# Patient Record
Sex: FEMALE | Race: WHITE | NOT HISPANIC OR LATINO | Employment: OTHER | URBAN - METROPOLITAN AREA
[De-identification: names, ages, dates, MRNs, and addresses within clinical notes are randomized per-mention and may not be internally consistent; named-entity substitution may affect disease eponyms.]

---

## 2019-01-14 ENCOUNTER — OFFICE VISIT (OUTPATIENT)
Dept: OBGYN CLINIC | Facility: CLINIC | Age: 69
End: 2019-01-14
Payer: MEDICARE

## 2019-01-14 VITALS
SYSTOLIC BLOOD PRESSURE: 128 MMHG | DIASTOLIC BLOOD PRESSURE: 92 MMHG | WEIGHT: 139.5 LBS | BODY MASS INDEX: 26.34 KG/M2 | HEIGHT: 61 IN

## 2019-01-14 DIAGNOSIS — Z01.419 ENCOUNTER FOR GYNECOLOGICAL EXAMINATION (GENERAL) (ROUTINE) WITHOUT ABNORMAL FINDINGS: Primary | ICD-10-CM

## 2019-01-14 DIAGNOSIS — Z12.31 ENCOUNTER FOR SCREENING MAMMOGRAM FOR MALIGNANT NEOPLASM OF BREAST: ICD-10-CM

## 2019-01-14 PROBLEM — C50.912 MALIGNANT NEOPLASM OF LEFT FEMALE BREAST (HCC): Status: ACTIVE | Noted: 2019-01-14

## 2019-01-14 PROCEDURE — G0101 CA SCREEN;PELVIC/BREAST EXAM: HCPCS | Performed by: OBSTETRICS & GYNECOLOGY

## 2019-01-14 RX ORDER — LANOLIN ALCOHOL/MO/W.PET/CERES
CREAM (GRAM) TOPICAL
COMMUNITY

## 2019-01-14 RX ORDER — LANSOPRAZOLE 15 MG/1
15 CAPSULE, DELAYED RELEASE ORAL DAILY
COMMUNITY

## 2019-01-14 RX ORDER — ATORVASTATIN CALCIUM 10 MG/1
10 TABLET, FILM COATED ORAL
COMMUNITY

## 2019-01-14 RX ORDER — MINOCYCLINE HYDROCHLORIDE 50 MG/1
100 CAPSULE ORAL DAILY
COMMUNITY

## 2019-01-14 NOTE — PROGRESS NOTES
ASSESSMENT & PLAN: Husam Watt was seen today for gynecologic exam     Diagnoses and all orders for this visit:    Encounter for gynecological examination (general) (routine) without abnormal findings    Encounter for screening mammogram for malignant neoplasm of breast  -     Mammo screening bilateral w 3d & cad; Future      1  Routine well woman exam done today  2   Pap and HPV:  Pap and cotesting was not done today  Current ASCCP Guidelines reviewed  3   Mammogram was ordered  4   Colonoscopy is up to date  5   DEXA is up to date  DEXA was not ordered today  6  The following were reviewed in today's visit: adequate intake of calcium and vitamin D, exercise and healthy diet  7   F/u 1 year   8  Discussed trying different vaginal lubricants for intercourse and to consider other alternative therapies  CC:  Annual Gynecologic Examination    HPI: Elieser Fuchs is a 79 y o  who presents for annual gynecologic examination  She has the following concerns:  Pt with h/o hyster and intercourse is getting more painful  Pt with h/o breast cancer which was estrogen receptor  Using replens  Wants to know if there is any other options for people with h/o breast cancer  Health Maintenance:    Patient describes her health as excellent  Patient has weight concerns  She exercises 2 days per week with gym, skis and walks  She does wears her seatbelt routinely  She does perform regular monthly self breast exams  She does feel safe at home  Patients does not follow a special diet  Patient gets 1 servings of dairy or calcium rich foods daily        Last pap:   Last mammogram:   Last colonoscopy:   DEXA: 2017    Patient Active Problem List   Diagnosis    Malignant neoplasm of left female breast [facility])       Past Medical History:   Diagnosis Date    Cancer ([address] )     Hypertension        Past Surgical History:   Procedure Laterality Date    BREAST LUMPECTOMY Left      SECTION      COLONOSCOPY      FOOT ARTHRODESIS, MODIFIED DU      HYSTERECTOMY      MOHS SURGERY      SKIN CANCER EXCISION      BACK       Past OB/Gyn History:    Patient is currently sexually active  heterosexual     Family History   Problem Relation Age of Onset    Congenital heart disease Mother     Anuerysm Father     Heart attack Brother     Colon cancer Paternal Grandmother     Colon cancer Paternal Grandfather     Heart attack Paternal Aunt     Heart attack Paternal Uncle        Social History:   Social History     Social History    Marital status: /Civil Union     Spouse name: N/A    Number of children: N/A    Years of education: N/A     Occupational History    Not on file  Social History Main Topics    Smoking status: Never Smoker    Smokeless tobacco: Never Used    Alcohol use Yes      Comment: Social    Drug use: No    Sexual activity: Yes     Partners: Male     Birth control/ protection: Post-menopausal     Other Topics Concern    Not on file     Social History Narrative    No narrative on file     Presently lives with spouse  Patient is currently retired  Allergies   Allergen Reactions    Other          Current Outpatient Prescriptions:     atorvastatin (LIPITOR) 10 mg tablet, Take by mouth, Disp: , Rfl:     calcium citrate-vitamin D (CITRACAL+D) 315-200 MG-UNIT per tablet, Take by mouth, Disp: , Rfl:     lansoprazole (PREVACID) 15 mg capsule, Take 15 mg by mouth daily, Disp: , Rfl:     metoprolol tartrate (LOPRESSOR) 25 mg tablet, Take by mouth, Disp: , Rfl:     minocycline (MINOCIN) 100 mg capsule, Take 100 mg by mouth every 12 (twelve) hours, Disp: , Rfl:     Multiple Vitamin (MULTIVITAMINS PO), Take by mouth, Disp: , Rfl:     Review of Systems  Constitutional :no fever, feels well, no tiredness, no recent weight gain or loss  ENT: no ear ache, no loss of hearing, no nosebleeds or nasal discharge, no sore throat or hoarseness    Cardiovascular: no complaints of slow or fast heart beat, no chest pain, no palpitations, no leg claudication or lower extremity edema  Respiratory: no complaints of shortness of shortness of breath, no SMILEY  Breasts:no complaints of breast pain, breast lump, or nipple discharge  Gastrointestinal: no complaints of abdominal pain, constipation, nausea, vomiting, or diarrhea or bloody stools  Genitourinary : no complaints of dysuria, incontinence, pelvic pain, no dysmenorrhea, vaginal discharge or abnormal vaginal bleeding and as noted in HPI  Musculoskeletal: no complaints of arthralgia, no myalgia, no joint swelling or stiffness, no limb pain or swelling  Integumentary: no complaints of skin rash or lesion, itching or dry skin  Neurological: no complaints of headache, no confusion, no numbness or tingling, no dizziness or fainting     Physical Exam:   /92   Ht 5' 1" (1 549 m)   Wt 63 3 kg (139 lb 8 oz)   BMI 26 36 kg/m²     General: appears stated age, cooperative, alert normal mood and affect   Psychiatric oriented to person, place and time  Mood and affect normal   Neck: normal, supple,trachea midline, no masses  Thyroid: normal, no thyromegaly   Heart: regular rate and rhythm, S1, S2 normal, no murmur, click, rub or gallop   Lungs: clear to auscultation bilaterally, no increased work of breathing or signs of respiratory distress   Breasts: normal, no dimpling or skin changes noted, left breast with well-healed surgical incisions and evidence of lumpectomy     Abdomen: soft, non-tender, without masses or organomegaly   Vulva: normal , no lesions   Vagina: normal , no lesions or dryness   Urethra: normal   Urethal meatus normal   Bladder Normal, soft, non-tender and no prolapse or masses appreciated   Cervix: Surgically absent   Uterus: Surgically absent   Adnexa: normal, non-tender without fullness or masses   Lymphatic Palpation of lymph nodes in neck, axilla, groin and/or other locations: no lymphadenopathy or masses noted   Skin Normal skin turgor and no rashes    Palpation of skin and subcutaneous tissue normal

## 2019-02-19 ENCOUNTER — HOSPITAL ENCOUNTER (OUTPATIENT)
Dept: MAMMOGRAPHY | Facility: HOSPITAL | Age: 69
Discharge: HOME | End: 2019-02-19
Payer: MEDICARE

## 2019-02-19 VITALS — BODY MASS INDEX: 26.24 KG/M2 | HEIGHT: 61 IN | WEIGHT: 139 LBS

## 2019-02-19 DIAGNOSIS — Z12.31 ENCOUNTER FOR SCREENING MAMMOGRAM FOR MALIGNANT NEOPLASM OF BREAST: ICD-10-CM

## 2019-02-19 PROCEDURE — 77063 BREAST TOMOSYNTHESIS BI: CPT

## 2019-02-19 PROCEDURE — 77067 SCR MAMMO BI INCL CAD: CPT

## 2019-11-18 DIAGNOSIS — Z12.31 ENCOUNTER FOR SCREENING MAMMOGRAM FOR MALIGNANT NEOPLASM OF BREAST: Primary | ICD-10-CM

## 2020-01-17 ENCOUNTER — ANNUAL EXAM (OUTPATIENT)
Dept: OBGYN CLINIC | Facility: CLINIC | Age: 70
End: 2020-01-17
Payer: MEDICARE

## 2020-01-17 VITALS
HEIGHT: 61 IN | SYSTOLIC BLOOD PRESSURE: 130 MMHG | BODY MASS INDEX: 22.94 KG/M2 | DIASTOLIC BLOOD PRESSURE: 90 MMHG | WEIGHT: 121.5 LBS

## 2020-01-17 DIAGNOSIS — Z01.419 ENCOUNTER FOR GYNECOLOGICAL EXAMINATION (GENERAL) (ROUTINE) WITHOUT ABNORMAL FINDINGS: Primary | ICD-10-CM

## 2020-01-17 DIAGNOSIS — Z13.820 ENCOUNTER FOR OSTEOPOROSIS SCREENING IN ASYMPTOMATIC POSTMENOPAUSAL PATIENT: ICD-10-CM

## 2020-01-17 DIAGNOSIS — Z78.0 ENCOUNTER FOR OSTEOPOROSIS SCREENING IN ASYMPTOMATIC POSTMENOPAUSAL PATIENT: ICD-10-CM

## 2020-01-17 PROCEDURE — G0101 CA SCREEN;PELVIC/BREAST EXAM: HCPCS | Performed by: OBSTETRICS & GYNECOLOGY

## 2020-01-17 NOTE — PATIENT INSTRUCTIONS
Thank you for your confidence in our team    We appreciate you and welcome your feedback  If you receive a survey from us, please take a few moments to let us know how we are doing     Sincerely,   Tana Morris MD

## 2020-01-17 NOTE — PROGRESS NOTES
ASSESSMENT & PLAN: Edwin Manjarrez was seen today for gynecologic exam     Diagnoses and all orders for this visit:    Encounter for gynecological examination (general) (routine) without abnormal findings    Encounter for osteoporosis screening in asymptomatic postmenopausal patient  -     DXA bone density spine hip and pelvis; Future      1  Routine well woman exam done today  2   Pap and HPV:  Pap and cotesting was not done today  Current ASCCP Guidelines reviewed  3   Mammogram was ordered  4   Colorectal cancer screening is not up to date  Patient given contact information for GI to schedule  5   DEXA is not up to date  DEXA was ordered today  6  The following were reviewed in today's visit: adequate intake of calcium and vitamin D, exercise and healthy diet  7   F/u 1 year for next routine gyn exam       CC:  Annual Gynecologic Examination    HPI: Consuelo Barnett is a 81 y o  who presents for annual gynecologic examination  She has the following concerns:  No issues  Health Maintenance:    Patient describes her health as excellent  Patient does not have weight concerns  She exercises 2-3 days per week with gym, skis and walks  She does wear her seatbelt routinely  She does perform regular monthly self breast exams  She does feel safe at home  Patients does follow a healthy diet  Patient gets 1 servings of dairy or calcium rich foods daily        Last pap: n/a  Last mammogram:   Last colorectal cancer screenin  Last DEXA:     Patient Active Problem List   Diagnosis    Malignant neoplasm of left female breast [facility])       Past Medical History:   Diagnosis Date    [facility] [address] [address] 10/2004    lt breast lumpectomy    Cancer [address] [address]     History of radiation therapy 10/2004    lt breast cancer lumpectomy    Hypertension        Past Surgical History:   Procedure Laterality Date    BREAST BIOPSY Left     benign    BREAST LUMPECTOMY Left 10/2004    lt breast cancer     SECTION      COLONOSCOPY      FOOT ARTHRODESIS, MODIFIED DU      HYSTERECTOMY Bilateral     2006    MOHS SURGERY      OOPHORECTOMY Bilateral 2006    SKIN CANCER EXCISION      BACK       Past OB/Gyn History:    Patient is currently sexually active    heterosexual     Family History   Problem Relation Age of Onset    Congenital heart disease Mother     Breast cancer Mother     Anuerysm Father     Heart attack Brother     Colon cancer Paternal Grandmother     Colon cancer Paternal Grandfather     Heart attack Paternal Aunt     Heart attack Paternal Uncle        Social History:   Social History     Socioeconomic History    Marital status: /Civil Union     Spouse name: Not on file    Number of children: Not on file    Years of education: Not on file    Highest education level: Not on file   Occupational History    Not on file   Social Needs    Financial resource strain: Not on file    Food insecurity:     Worry: Not on file     Inability: Not on file    Transportation needs:     Medical: Not on file     Non-medical: Not on file   Tobacco Use    Smoking status: Never Smoker    Smokeless tobacco: Never Used   Substance and Sexual Activity    Alcohol use: Yes     Comment: Social    Drug use: No    Sexual activity: Yes     Partners: Male     Birth control/protection: Post-menopausal   Lifestyle    Physical activity:     Days per week: Not on file     Minutes per session: Not on file    Stress: Not on file   Relationships    Social connections:     Talks on phone: Not on file     Gets together: Not on file     Attends [employer]ars[employer] service: Not on file     Active member of club or organization: Not on file     Attends meetings of clubs or organizations: Not on file     Relationship status: Not on file    Intimate partner violence:     Fear of current or ex partner: Not on file     Emotionally abused: Not on file     Physically abused: Not on file     Forced sexual activity: Not on file Other Topics Concern    Not on file   Social History Narrative    Not on file     Presently lives with spouse  Patient is currently retired  Allergies   Allergen Reactions    Adhesive [Medical Tape]          Current Outpatient Medications:     atorvastatin (LIPITOR) 10 mg tablet, Take by mouth, Disp: , Rfl:     calcium citrate-vitamin D (CITRACAL+D) 315-200 MG-UNIT per tablet, Take by mouth, Disp: , Rfl:     lansoprazole (PREVACID) 15 mg capsule, Take 15 mg by mouth daily, Disp: , Rfl:     metoprolol tartrate (LOPRESSOR) 25 mg tablet, Take by mouth, Disp: , Rfl:     minocycline (MINOCIN) 100 mg capsule, Take 100 mg by mouth daily , Disp: , Rfl:     Multiple Vitamin (MULTIVITAMINS PO), Take by mouth, Disp: , Rfl:     Review of Systems  Constitutional :no fever, feels well, no tiredness, no recent weight gain or loss  ENT: no ear ache, no loss of hearing, no nosebleeds or nasal discharge, no sore throat or hoarseness  Cardiovascular: no complaints of slow or fast heart beat, no chest pain, no palpitations, no leg claudication or lower extremity edema  Respiratory: no complaints of shortness of shortness of breath, no SMILEY  Breasts:no complaints of breast pain, breast lump, or nipple discharge  Gastrointestinal: no complaints of abdominal pain, constipation, nausea, vomiting, or diarrhea or bloody stools  Genitourinary : no complaints of dysuria, incontinence, pelvic pain, no dysmenorrhea, vaginal discharge or abnormal vaginal bleeding and as noted in HPI  Musculoskeletal: no complaints of arthralgia, no myalgia, no joint swelling or stiffness, no limb pain or swelling    Integumentary: no complaints of skin rash or lesion, itching or dry skin  Neurological: no complaints of headache, no confusion, no numbness or tingling, no dizziness or fainting     Physical Exam:   /90   Ht 5' 1" (1 549 m)   Wt 55 1 kg (121 lb 8 oz)   BMI 22 96 kg/m²     General: appears stated age, cooperative, alert normal mood and affect   Psychiatric oriented to person, place and time  Mood and affect normal   Neck: normal, supple,trachea midline, no masses  Thyroid: normal, no thyromegaly   Heart: regular rate and rhythm, S1, S2 normal, no murmur, click, rub or gallop   Lungs: clear to auscultation bilaterally, no increased work of breathing or signs of respiratory distress   Breasts: normal, no dimpling or skin changes noted, left breast with well-healed surgical incisions and evidence of lumpectomy  Abdomen: soft, non-tender, without masses or organomegaly   Vulva: normal , no lesions   Vagina: normal , no lesions, severe dryness   Urethra: normal   Urethal meatus normal   Bladder Normal, soft, non-tender and no prolapse or masses appreciated   Cervix: Surgically absent   Uterus: Surgically absent   Adnexa: normal, non-tender without fullness or masses   Lymphatic Palpation of lymph nodes in neck, axilla, groin and/or other locations: no lymphadenopathy or masses noted   Skin Normal skin turgor and no rashes    Palpation of skin and subcutaneous tissue normal

## 2020-02-20 ENCOUNTER — HOSPITAL ENCOUNTER (OUTPATIENT)
Dept: MAMMOGRAPHY | Facility: HOSPITAL | Age: 70
Discharge: HOME | End: 2020-02-20
Payer: MEDICARE

## 2020-02-20 ENCOUNTER — HOSPITAL ENCOUNTER (OUTPATIENT)
Dept: BONE DENSITY | Facility: HOSPITAL | Age: 70
Discharge: HOME | End: 2020-02-20
Payer: MEDICARE

## 2020-02-20 VITALS — HEIGHT: 61 IN | WEIGHT: 122 LBS | BODY MASS INDEX: 23.03 KG/M2

## 2020-02-20 DIAGNOSIS — Z12.31 ENCOUNTER FOR SCREENING MAMMOGRAM FOR MALIGNANT NEOPLASM OF BREAST: ICD-10-CM

## 2020-02-20 DIAGNOSIS — Z78.0 ENCOUNTER FOR OSTEOPOROSIS SCREENING IN ASYMPTOMATIC POSTMENOPAUSAL PATIENT: ICD-10-CM

## 2020-02-20 DIAGNOSIS — Z13.820 ENCOUNTER FOR OSTEOPOROSIS SCREENING IN ASYMPTOMATIC POSTMENOPAUSAL PATIENT: ICD-10-CM

## 2020-02-20 PROCEDURE — 77063 BREAST TOMOSYNTHESIS BI: CPT

## 2020-02-20 PROCEDURE — 77067 SCR MAMMO BI INCL CAD: CPT

## 2020-02-20 PROCEDURE — 77080 DXA BONE DENSITY AXIAL: CPT

## 2020-02-24 NOTE — RESULT ENCOUNTER NOTE
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

## 2020-07-29 ENCOUNTER — HOSPITAL ENCOUNTER (OUTPATIENT)
Dept: RADIOLOGY | Facility: HOSPITAL | Age: 70
Discharge: HOME | End: 2020-07-29
Attending: INTERNAL MEDICINE
Payer: MEDICARE

## 2020-07-29 ENCOUNTER — TRANSCRIBE ORDERS (OUTPATIENT)
Dept: ADMINISTRATIVE | Facility: HOSPITAL | Age: 70
End: 2020-07-29

## 2020-07-29 DIAGNOSIS — M53.3 COCCYX PAIN: ICD-10-CM

## 2020-07-29 DIAGNOSIS — M54.2 NECK PAIN: ICD-10-CM

## 2020-07-29 DIAGNOSIS — M54.2 NECK PAIN: Primary | ICD-10-CM

## 2020-07-29 PROCEDURE — 72050 X-RAY EXAM NECK SPINE 4/5VWS: CPT

## 2020-07-29 PROCEDURE — 72110 X-RAY EXAM L-2 SPINE 4/>VWS: CPT

## 2021-02-17 ENCOUNTER — NURSE TRIAGE (OUTPATIENT)
Dept: OTHER | Facility: OTHER | Age: 71
End: 2021-02-17

## 2021-02-17 DIAGNOSIS — B34.9 VIRAL SYNDROME: Primary | ICD-10-CM

## 2021-02-17 DIAGNOSIS — B34.9 VIRAL SYNDROME: ICD-10-CM

## 2021-02-17 PROCEDURE — U0003 INFECTIOUS AGENT DETECTION BY NUCLEIC ACID (DNA OR RNA); SEVERE ACUTE RESPIRATORY SYNDROME CORONAVIRUS 2 (SARS-COV-2) (CORONAVIRUS DISEASE [COVID-19]), AMPLIFIED PROBE TECHNIQUE, MAKING USE OF HIGH THROUGHPUT TECHNOLOGIES AS DESCRIBED BY CMS-2020-01-R: HCPCS | Performed by: FAMILY MEDICINE

## 2021-02-17 PROCEDURE — U0005 INFEC AGEN DETEC AMPLI PROBE: HCPCS | Performed by: FAMILY MEDICINE

## 2021-02-17 NOTE — TELEPHONE ENCOUNTER
1  Were you within 6 feet or less, for up to 15 minutes or more with a person that has a confirmed COVID-19 test?     (+) close contact with daughter who tested positive on Monday 2/15/21    2  What was the date of your exposure? Friday 2/12/21 and over the weekend    3  Are you experiencing any symptoms attributed to the virus?  (Assess for SOB, cough, fever, difficulty breathing)     Cough, chest congestion/heaviness, fatigue    4  HIGH RISK: Do you have any history heart or lung conditions, weakened immune system, diabetes, Asthma, CHF, HIV, COPD, Chemo, renal failure, sickle cell, etc?     Hypertension, hyperlipidemia    5   PREGNANCY: Are you pregnant or did you recently give birth? n/a

## 2021-02-17 NOTE — TELEPHONE ENCOUNTER
Cardiology Reason for Disposition   [1] COVID-19 infection suspected by caller or triager AND [2] mild symptoms (cough, fever, or others) AND [0] no complications or SOB    Protocols used: CORONAVIRUS (COVID-19) DIAGNOSED OR SUSPECTED-ADULT-[place]

## 2021-02-17 NOTE — TELEPHONE ENCOUNTER
Discussed with patient's  criteria to be seen in person at an urgent care center or emergency department, including but not limited to chest pain, shortness of breath, and/or high fever (103F or higher) not responding to antipyretics  Also advised to contact PCP regarding symptoms and to establish possible virtual appointment, particularly if test result is positive

## 2021-02-17 NOTE — TELEPHONE ENCOUNTER
Regarding: covid symps cough tired   ----- Message from [employer] sent at 2/17/2021 12:50 PM EST -----  Covid Exposure and symp "coughing heavy chested tired and feeling week"    2 of 2

## 2021-02-18 ENCOUNTER — TELEPHONE (OUTPATIENT)
Dept: OTHER | Facility: OTHER | Age: 71
End: 2021-02-18

## 2021-02-18 LAB — SARS-COV-2 RNA RESP QL NAA+PROBE: POSITIVE

## 2021-02-19 NOTE — TELEPHONE ENCOUNTER
Your test for the novel coronavirus, also known as COVID-19, was positive  The sample showed that the virus was present  Positive COVID-19 test results are reportable to the [place] Department of Health  You may receive a call from trained public health staff to conduct an interview  It is important to answer their call  They will ask you to verify who you are  During the call they will ask you about what symptoms you have, what you did before you got sick, and who you were close to while sick  The health department does this to make sure everyone stays healthy and to reduce the spread of the virus  If you would like to verify if the caller does in fact work in contact tracing, call the [address] [place] [address] at [employer] ([phone])  For additional information, please visit the [facility]  website: [email or web address]     If you have any additional questions, we can schedule a virtual visit for you with a provider or call the [facility] hotline [phone], option 7, for care advice    For additional information, please visit the Coronavirus FAQ on the [facility] home page ([email or web address])

## 2021-03-10 DIAGNOSIS — Z23 ENCOUNTER FOR IMMUNIZATION: ICD-10-CM

## 2021-06-03 ENCOUNTER — OFFICE VISIT (OUTPATIENT)
Dept: CARDIOLOGY CLINIC | Facility: CLINIC | Age: 71
End: 2021-06-03
Payer: MEDICARE

## 2021-06-03 VITALS
SYSTOLIC BLOOD PRESSURE: 130 MMHG | WEIGHT: 122 LBS | DIASTOLIC BLOOD PRESSURE: 80 MMHG | HEART RATE: 70 BPM | HEIGHT: 62 IN | BODY MASS INDEX: 22.45 KG/M2

## 2021-06-03 DIAGNOSIS — I10 BENIGN HYPERTENSION: Primary | ICD-10-CM

## 2021-06-03 DIAGNOSIS — I49.3 PVC'S (PREMATURE VENTRICULAR CONTRACTIONS): ICD-10-CM

## 2021-06-03 PROCEDURE — 99203 OFFICE O/P NEW LOW 30 MIN: CPT | Performed by: INTERNAL MEDICINE

## 2021-06-03 PROCEDURE — 93000 ELECTROCARDIOGRAM COMPLETE: CPT | Performed by: INTERNAL MEDICINE

## 2021-06-03 NOTE — ASSESSMENT & PLAN NOTE
Frequent on exam although not found on the actual EKG we did today  I would like to be sure there is no underlying cardiomyopathy either as a cause of ectopy or from ectopy    A Holter monitor will be checked as will an echocardiogram

## 2021-06-03 NOTE — PROGRESS NOTES
Patient ID: Mohan Stoddard is a 78 y o  female  Plan:      Benign hypertension  Adequately controlled  PVC's (premature ventricular contractions)  Frequent on exam although not found on the actual EKG we did today  I would like to be sure there is no underlying cardiomyopathy either as a cause of ectopy or from ectopy  A Holter monitor will be checked as will an echocardiogram        Follow up Plan/Other summary comments: Follow-up only if the echo is abnormal and or the Holter appears problematic  HPI:   Patient is seen today to establish local cardiology care  Her daughter is and a physician in [facility] [place] had COVID in February  Most recently she has essentially felt well  She was told of extra beats but she does not feel them  Her exertional capacity has returned almost to pre COVID levels  There is certainly no chest pain or chest pressure  No syncope or near syncope  There is a prior history of left breast cancer with lumpectomy and radiation but no chemotherapy  Results for orders placed or performed in visit on 21   POCT ECG    Impression    NSR  WNL  Most recent or relevant cardiac/vascular testing:    None      Past Surgical History:   Procedure Laterality Date    BREAST BIOPSY Left     benign    BREAST LUMPECTOMY Left 10/2004    lt breast cancer     SECTION      COLONOSCOPY      FOOT ARTHRODESIS, MODIFIED DU      HYSTERECTOMY Bilateral         MOHS SURGERY      OOPHORECTOMY Bilateral     SKIN CANCER EXCISION      BACK     CMP: No results found for: NA, K, CL, CO2, BUN, CREATININE, GLUCOSE, EGFR    Lipid Profile: No results found for: CHOL, TRIG, HDL, LDL      Review of Systems   10  point ROS  was otherwise non pertinent or negative except as per HPI or as below     Gait: Normal         Objective:     /80   Pulse 70   Ht 5' 1 5" (1 562 m)   Wt 55 3 kg (122 lb)   BMI 22 68 kg/m²     PHYSICAL EXAM:    General:  Normal appearance in no distress  Eyes:  Anicteric  Oral mucosa:  Moist   Neck:  No JVD  Carotid upstrokes are brisk without bruits  No masses  Chest:  Clear to auscultation  Cardiac:  No palpable PMI  Normal S1 and S2  No murmur gallop or rub  Abdomen:  Soft and nontender  No palpable organomegaly or aortic enlargement  Extremities:  No peripheral edema  Musculoskeletal:  Symmetric  Vascular:  Femoral pulses are brisk without bruits  Popliteal pulses are intact bilaterally  Pedal pulses are intact  Neuro:  Grossly symmetric  Psych:  Alert and oriented x3          Current Outpatient Medications:     atorvastatin (LIPITOR) 10 mg tablet, Take 10 mg by mouth one tablet three times a week , Disp: , Rfl:     calcium citrate-vitamin D (CITRACAL+D) 315-200 MG-UNIT per tablet, Take by mouth, Disp: , Rfl:     metoprolol tartrate (LOPRESSOR) 25 mg tablet, Take 25 mg by mouth every 12 (twelve) hours , Disp: , Rfl:     minocycline (Minocin) 50 mg capsule, Take 100 mg by mouth daily , Disp: , Rfl:     Multiple Vitamin (MULTIVITAMINS PO), Take by mouth, Disp: , Rfl:     lansoprazole (PREVACID) 15 mg capsule, Take 15 mg by mouth daily, Disp: , Rfl:   Allergies   Allergen Reactions    Adhesive [Medical Tape]      Past Medical History:   Diagnosis Date    BRCA1 negative     BRCA2 negative     [facility] ) 10/2004    lt breast lumpectomy    Cancer ([facility] )     History of radiation therapy 10/2004    lt breast cancer lumpectomy    Hyperlipidemia     Hypertension            Social History     Tobacco Use   Smoking Status Never Smoker   Smokeless Tobacco Never Used

## 2021-06-07 ENCOUNTER — HOSPITAL ENCOUNTER (OUTPATIENT)
Dept: NON INVASIVE DIAGNOSTICS | Facility: CLINIC | Age: 71
Discharge: HOME | End: 2021-06-07
Payer: MEDICARE

## 2021-06-07 DIAGNOSIS — I49.3 PVC'S (PREMATURE VENTRICULAR CONTRACTIONS): ICD-10-CM

## 2021-06-07 DIAGNOSIS — I10 BENIGN HYPERTENSION: ICD-10-CM

## 2021-06-07 PROCEDURE — 93225 XTRNL ECG REC<48 HRS REC: CPT

## 2021-06-07 PROCEDURE — 93226 XTRNL ECG REC<48 HR SCAN A/R: CPT

## 2021-06-15 PROCEDURE — 93227 XTRNL ECG REC<48 HR R&I: CPT | Performed by: INTERNAL MEDICINE

## 2021-07-19 ENCOUNTER — HOSPITAL ENCOUNTER (OUTPATIENT)
Dept: NON INVASIVE DIAGNOSTICS | Facility: CLINIC | Age: 71
Discharge: HOME | End: 2021-07-19
Payer: MEDICARE

## 2021-07-19 DIAGNOSIS — I10 BENIGN HYPERTENSION: ICD-10-CM

## 2021-07-19 DIAGNOSIS — I49.3 PVC'S (PREMATURE VENTRICULAR CONTRACTIONS): ICD-10-CM

## 2021-07-19 PROCEDURE — 93306 TTE W/DOPPLER COMPLETE: CPT

## 2021-07-19 PROCEDURE — 93306 TTE W/DOPPLER COMPLETE: CPT | Performed by: INTERNAL MEDICINE

## 2021-11-02 ENCOUNTER — APPOINTMENT (OUTPATIENT)
Dept: LAB | Facility: CLINIC | Age: 71
End: 2021-11-02
Payer: MEDICARE

## 2021-11-02 DIAGNOSIS — E55.9 AVITAMINOSIS D: ICD-10-CM

## 2021-11-02 DIAGNOSIS — Z79.899 ENCOUNTER FOR LONG-TERM (CURRENT) USE OF OTHER MEDICATIONS: ICD-10-CM

## 2021-11-02 DIAGNOSIS — D64.9 ANEMIA, UNSPECIFIED TYPE: ICD-10-CM

## 2021-11-02 DIAGNOSIS — E78.5 HYPERLIPIDEMIA, UNSPECIFIED HYPERLIPIDEMIA TYPE: ICD-10-CM

## 2021-11-02 DIAGNOSIS — I10 ESSENTIAL HYPERTENSION, MALIGNANT: ICD-10-CM

## 2021-11-02 LAB
25(OH)D3 SERPL-MCNC: 39 NG/ML (ref 30–100)
ALBUMIN SERPL BCP-MCNC: 4.2 G/DL (ref 3.5–5)
ALP SERPL-CCNC: 89 U/L (ref 46–116)
ALT SERPL W P-5'-P-CCNC: 36 U/L (ref 12–78)
ANION GAP SERPL CALCULATED.3IONS-SCNC: 5 MMOL/L (ref 4–13)
AST SERPL W P-5'-P-CCNC: 29 U/L (ref 5–45)
BILIRUB SERPL-MCNC: 1 MG/DL (ref 0.2–1)
BUN SERPL-MCNC: 18 MG/DL (ref 5–25)
CALCIUM SERPL-MCNC: 9.4 MG/DL (ref 8.3–10.1)
CHLORIDE SERPL-SCNC: 107 MMOL/L (ref 100–108)
CHOLEST SERPL-MCNC: 211 MG/DL (ref 50–200)
CO2 SERPL-SCNC: 27 MMOL/L (ref 21–32)
CREAT SERPL-MCNC: 0.78 MG/DL (ref 0.6–1.3)
ERYTHROCYTE [DISTWIDTH] IN BLOOD BY AUTOMATED COUNT: 14 % (ref 11.6–15.1)
FERRITIN SERPL-MCNC: 77 NG/ML (ref 8–388)
GFR SERPL CREATININE-BSD FRML MDRD: 78 ML/MIN/1.73SQ M
GLUCOSE P FAST SERPL-MCNC: 90 MG/DL (ref 65–99)
HCT VFR BLD AUTO: 44.1 % (ref 34.8–46.1)
HDLC SERPL-MCNC: 101 MG/DL
HGB BLD-MCNC: 14.5 G/DL (ref 11.5–15.4)
IRON SERPL-MCNC: 186 UG/DL (ref 50–170)
LDLC SERPL CALC-MCNC: 94 MG/DL (ref 0–100)
MCH RBC QN AUTO: 31.9 PG (ref 26.8–34.3)
MCHC RBC AUTO-ENTMCNC: 32.9 G/DL (ref 31.4–37.4)
MCV RBC AUTO: 97 FL (ref 82–98)
NONHDLC SERPL-MCNC: 110 MG/DL
PLATELET # BLD AUTO: 225 THOUSANDS/UL (ref 149–390)
PMV BLD AUTO: 10.1 FL (ref 8.9–12.7)
POTASSIUM SERPL-SCNC: 3.8 MMOL/L (ref 3.5–5.3)
PROT SERPL-MCNC: 7.1 G/DL (ref 6.4–8.2)
RBC # BLD AUTO: 4.55 MILLION/UL (ref 3.81–5.12)
SODIUM SERPL-SCNC: 139 MMOL/L (ref 136–145)
TIBC SERPL-MCNC: 277 UG/DL (ref 250–450)
TRIGL SERPL-MCNC: 79 MG/DL
WBC # BLD AUTO: 3.38 THOUSAND/UL (ref 4.31–10.16)

## 2021-11-02 PROCEDURE — 80061 LIPID PANEL: CPT

## 2021-11-02 PROCEDURE — 83550 IRON BINDING TEST: CPT

## 2021-11-02 PROCEDURE — 80053 COMPREHEN METABOLIC PANEL: CPT

## 2021-11-02 PROCEDURE — 83540 ASSAY OF IRON: CPT

## 2021-11-02 PROCEDURE — 82728 ASSAY OF FERRITIN: CPT

## 2021-11-02 PROCEDURE — 36415 COLL VENOUS BLD VENIPUNCTURE: CPT

## 2021-11-02 PROCEDURE — 85027 COMPLETE CBC AUTOMATED: CPT

## 2021-11-02 PROCEDURE — 82747 ASSAY OF FOLIC ACID RBC: CPT

## 2021-11-02 PROCEDURE — 82306 VITAMIN D 25 HYDROXY: CPT

## 2021-11-03 LAB
FOLATE BLD-MCNC: 480 NG/ML
FOLATE RBC-MCNC: 1101 NG/ML
HCT VFR BLD AUTO: 43.6 % (ref 34–46.6)

## 2022-04-29 ENCOUNTER — APPOINTMENT (OUTPATIENT)
Dept: LAB | Facility: CLINIC | Age: 72
End: 2022-04-29
Payer: MEDICARE

## 2022-04-29 DIAGNOSIS — E55.9 AVITAMINOSIS D: ICD-10-CM

## 2022-04-29 DIAGNOSIS — E03.9 ACQUIRED HYPOTHYROIDISM: ICD-10-CM

## 2022-04-29 DIAGNOSIS — Z79.899 ENCOUNTER FOR LONG-TERM (CURRENT) USE OF OTHER MEDICATIONS: ICD-10-CM

## 2022-04-29 DIAGNOSIS — I10 ESSENTIAL HYPERTENSION, MALIGNANT: ICD-10-CM

## 2022-04-29 DIAGNOSIS — E78.5 HYPERLIPIDEMIA, UNSPECIFIED HYPERLIPIDEMIA TYPE: ICD-10-CM

## 2022-04-29 DIAGNOSIS — D64.9 ANEMIA, UNSPECIFIED TYPE: ICD-10-CM

## 2022-04-29 DIAGNOSIS — R73.9 HYPERGLYCEMIA: ICD-10-CM

## 2022-04-29 LAB
25(OH)D3 SERPL-MCNC: 50.6 NG/ML (ref 30–100)
ALBUMIN SERPL BCP-MCNC: 3.8 G/DL (ref 3.5–5)
ALP SERPL-CCNC: 93 U/L (ref 46–116)
ALT SERPL W P-5'-P-CCNC: 40 U/L (ref 12–78)
ANION GAP SERPL CALCULATED.3IONS-SCNC: 1 MMOL/L (ref 4–13)
AST SERPL W P-5'-P-CCNC: 31 U/L (ref 5–45)
BILIRUB SERPL-MCNC: 0.92 MG/DL (ref 0.2–1)
BUN SERPL-MCNC: 17 MG/DL (ref 5–25)
CALCIUM SERPL-MCNC: 9.6 MG/DL (ref 8.3–10.1)
CHLORIDE SERPL-SCNC: 108 MMOL/L (ref 100–108)
CHOLEST SERPL-MCNC: 207 MG/DL
CO2 SERPL-SCNC: 31 MMOL/L (ref 21–32)
CREAT SERPL-MCNC: 0.91 MG/DL (ref 0.6–1.3)
ERYTHROCYTE [DISTWIDTH] IN BLOOD BY AUTOMATED COUNT: 14.4 % (ref 11.6–15.1)
EST. AVERAGE GLUCOSE BLD GHB EST-MCNC: 103 MG/DL
FERRITIN SERPL-MCNC: 67 NG/ML (ref 8–388)
GFR SERPL CREATININE-BSD FRML MDRD: 64 ML/MIN/1.73SQ M
GLUCOSE P FAST SERPL-MCNC: 92 MG/DL (ref 65–99)
HBA1C MFR BLD: 5.2 %
HCT VFR BLD AUTO: 44.1 % (ref 34.8–46.1)
HDLC SERPL-MCNC: 98 MG/DL
HGB BLD-MCNC: 14.6 G/DL (ref 11.5–15.4)
IRON SERPL-MCNC: 203 UG/DL (ref 50–170)
LDLC SERPL CALC-MCNC: 98 MG/DL (ref 0–100)
MCH RBC QN AUTO: 31.5 PG (ref 26.8–34.3)
MCHC RBC AUTO-ENTMCNC: 33.1 G/DL (ref 31.4–37.4)
MCV RBC AUTO: 95 FL (ref 82–98)
NONHDLC SERPL-MCNC: 109 MG/DL
PLATELET # BLD AUTO: 234 THOUSANDS/UL (ref 149–390)
PMV BLD AUTO: 10.3 FL (ref 8.9–12.7)
POTASSIUM SERPL-SCNC: 4.3 MMOL/L (ref 3.5–5.3)
PROT SERPL-MCNC: 7.1 G/DL (ref 6.4–8.2)
RBC # BLD AUTO: 4.64 MILLION/UL (ref 3.81–5.12)
SODIUM SERPL-SCNC: 140 MMOL/L (ref 136–145)
T4 FREE SERPL-MCNC: 1.01 NG/DL (ref 0.76–1.46)
TIBC SERPL-MCNC: 291 UG/DL (ref 250–450)
TRIGL SERPL-MCNC: 56 MG/DL
TSH SERPL DL<=0.05 MIU/L-ACNC: 3.47 UIU/ML (ref 0.45–4.5)
WBC # BLD AUTO: 3.65 THOUSAND/UL (ref 4.31–10.16)

## 2022-04-29 PROCEDURE — 82728 ASSAY OF FERRITIN: CPT

## 2022-04-29 PROCEDURE — 83540 ASSAY OF IRON: CPT

## 2022-04-29 PROCEDURE — 84439 ASSAY OF FREE THYROXINE: CPT

## 2022-04-29 PROCEDURE — 82747 ASSAY OF FOLIC ACID RBC: CPT

## 2022-04-29 PROCEDURE — 80061 LIPID PANEL: CPT

## 2022-04-29 PROCEDURE — 83550 IRON BINDING TEST: CPT

## 2022-04-29 PROCEDURE — 84443 ASSAY THYROID STIM HORMONE: CPT

## 2022-04-29 PROCEDURE — 83036 HEMOGLOBIN GLYCOSYLATED A1C: CPT

## 2022-04-29 PROCEDURE — 36415 COLL VENOUS BLD VENIPUNCTURE: CPT

## 2022-04-29 PROCEDURE — 85027 COMPLETE CBC AUTOMATED: CPT

## 2022-04-29 PROCEDURE — 82306 VITAMIN D 25 HYDROXY: CPT

## 2022-04-29 PROCEDURE — 80053 COMPREHEN METABOLIC PANEL: CPT

## 2022-04-30 LAB
FOLATE BLD-MCNC: >620 NG/ML
FOLATE RBC-MCNC: >1432 NG/ML
HCT VFR BLD AUTO: 43.3 % (ref 34–46.6)

## 2022-11-08 ENCOUNTER — APPOINTMENT (OUTPATIENT)
Dept: LAB | Facility: CLINIC | Age: 72
End: 2022-11-08

## 2022-11-08 DIAGNOSIS — Z79.02 ENCOUNTER FOR LONG-TERM (CURRENT) USE OF ANTIPLATELETS/ANTITHROMBOTICS: ICD-10-CM

## 2022-11-08 DIAGNOSIS — I51.9 MYXEDEMA HEART DISEASE: ICD-10-CM

## 2022-11-08 DIAGNOSIS — E03.9 MYXEDEMA HEART DISEASE: ICD-10-CM

## 2022-11-08 DIAGNOSIS — E55.9 VITAMIN D DEFICIENCY: ICD-10-CM

## 2022-11-08 DIAGNOSIS — I10 ESSENTIAL HYPERTENSION, MALIGNANT: ICD-10-CM

## 2022-11-08 DIAGNOSIS — E78.00 PURE HYPERCHOLESTEROLEMIA: ICD-10-CM

## 2022-11-08 LAB
25(OH)D3 SERPL-MCNC: 44.3 NG/ML (ref 30–100)
ALBUMIN SERPL BCP-MCNC: 3.8 G/DL (ref 3.5–5)
ALP SERPL-CCNC: 89 U/L (ref 46–116)
ALT SERPL W P-5'-P-CCNC: 35 U/L (ref 12–78)
ANION GAP SERPL CALCULATED.3IONS-SCNC: 2 MMOL/L (ref 4–13)
AST SERPL W P-5'-P-CCNC: 26 U/L (ref 5–45)
BILIRUB SERPL-MCNC: 0.8 MG/DL (ref 0.2–1)
BUN SERPL-MCNC: 13 MG/DL (ref 5–25)
CALCIUM SERPL-MCNC: 9.5 MG/DL (ref 8.3–10.1)
CHLORIDE SERPL-SCNC: 108 MMOL/L (ref 96–108)
CHOLEST SERPL-MCNC: 203 MG/DL
CO2 SERPL-SCNC: 30 MMOL/L (ref 21–32)
CREAT SERPL-MCNC: 0.83 MG/DL (ref 0.6–1.3)
ERYTHROCYTE [DISTWIDTH] IN BLOOD BY AUTOMATED COUNT: 14.2 % (ref 11.6–15.1)
EST. AVERAGE GLUCOSE BLD GHB EST-MCNC: 103 MG/DL
GFR SERPL CREATININE-BSD FRML MDRD: 71 ML/MIN/1.73SQ M
GLUCOSE P FAST SERPL-MCNC: 96 MG/DL (ref 65–99)
HBA1C MFR BLD: 5.2 %
HCT VFR BLD AUTO: 44.2 % (ref 34.8–46.1)
HDLC SERPL-MCNC: 79 MG/DL
HGB BLD-MCNC: 14.1 G/DL (ref 11.5–15.4)
LDLC SERPL CALC-MCNC: 89 MG/DL (ref 0–100)
MCH RBC QN AUTO: 30.6 PG (ref 26.8–34.3)
MCHC RBC AUTO-ENTMCNC: 31.9 G/DL (ref 31.4–37.4)
MCV RBC AUTO: 96 FL (ref 82–98)
NONHDLC SERPL-MCNC: 124 MG/DL
PLATELET # BLD AUTO: 276 THOUSANDS/UL (ref 149–390)
PMV BLD AUTO: 9.9 FL (ref 8.9–12.7)
POTASSIUM SERPL-SCNC: 4.2 MMOL/L (ref 3.5–5.3)
PROT SERPL-MCNC: 7.1 G/DL (ref 6.4–8.4)
RBC # BLD AUTO: 4.61 MILLION/UL (ref 3.81–5.12)
SODIUM SERPL-SCNC: 140 MMOL/L (ref 135–147)
T4 FREE SERPL-MCNC: 0.84 NG/DL (ref 0.76–1.46)
TRIGL SERPL-MCNC: 175 MG/DL
TSH SERPL DL<=0.05 MIU/L-ACNC: 3.7 UIU/ML (ref 0.45–4.5)
WBC # BLD AUTO: 3.63 THOUSAND/UL (ref 4.31–10.16)

## 2022-11-30 ENCOUNTER — OFFICE VISIT (OUTPATIENT)
Dept: URGENT CARE | Facility: CLINIC | Age: 72
End: 2022-11-30

## 2022-11-30 VITALS
RESPIRATION RATE: 18 BRPM | WEIGHT: 122 LBS | OXYGEN SATURATION: 98 % | TEMPERATURE: 98.2 F | SYSTOLIC BLOOD PRESSURE: 144 MMHG | HEART RATE: 64 BPM | BODY MASS INDEX: 22.68 KG/M2 | DIASTOLIC BLOOD PRESSURE: 87 MMHG

## 2022-11-30 DIAGNOSIS — B96.89 ACUTE BACTERIAL BRONCHITIS: Primary | ICD-10-CM

## 2022-11-30 DIAGNOSIS — J20.8 ACUTE BACTERIAL BRONCHITIS: Primary | ICD-10-CM

## 2022-11-30 RX ORDER — BENZONATATE 200 MG/1
200 CAPSULE ORAL 3 TIMES DAILY PRN
Qty: 20 CAPSULE | Refills: 0 | Status: SHIPPED | OUTPATIENT
Start: 2022-11-30

## 2022-11-30 RX ORDER — AZITHROMYCIN 250 MG/1
TABLET, FILM COATED ORAL
Qty: 6 TABLET | Refills: 0 | Status: SHIPPED | OUTPATIENT
Start: 2022-11-30 | End: 2022-12-04

## 2022-11-30 NOTE — PATIENT INSTRUCTIONS
May use Sudafed  May use Afrin as needed, but no more than the next 5 days  Follow up with PCP in 3-5 days  Proceed to  ER if symptoms worsen  Acute Bronchitis   AMBULATORY CARE:   Acute bronchitis  is swelling and irritation in your lungs  It is usually caused by a virus and most often happens in the winter  Bronchitis may also be caused by bacteria or by a chemical irritant, such as smoke  Common symptoms include the following:   Cough that lasts up to 3 weeks, stuffy nose    Hoarseness, sore throat    A fever and chills    Feeling more tired than usual, and body aches    Wheezing or pain when you breathe or cough    Seek care immediately if:   You cough up blood  Your lips or fingernails turn blue  You feel like you are not getting enough air when you breathe  Call your doctor if:   Your symptoms do not go away or get worse, even after treatment  Your cough does not get better within 4 weeks  You have questions or concerns about your condition or care  Medicines: You may  need any of the following:  Cough suppressants  decrease your urge to cough  Decongestants  help loosen mucus in your lungs and make it easier to cough up  This can help you breathe easier  Inhalers  may be given  Your healthcare provider may give you one or more inhalers to help you breathe easier and cough less  An inhaler gives your medicine to open your airways  Ask your healthcare provider to show you how to use your inhaler correctly  Antibiotics  may be given for up to 5 days if your bronchitis is caused by bacteria  Acetaminophen  decreases pain and fever  It is available without a doctor's order  Ask how much to take and how often to take it  Follow directions  Read the labels of all other medicines you are using to see if they also contain acetaminophen, or ask your doctor or pharmacist  Acetaminophen can cause liver damage if not taken correctly   Do not use more than 4 grams (4,000 milligrams) total of acetaminophen in one day  NSAIDs  help decrease swelling and pain or fever  This medicine is available with or without a doctor's order  NSAIDs can cause stomach bleeding or kidney problems in certain people  If you take blood thinner medicine, always ask your healthcare provider if NSAIDs are safe for you  Always read the medicine label and follow directions  Take your medicine as directed  Contact your healthcare provider if you think your medicine is not helping or if you have side effects  Tell him of her if you are allergic to any medicine  Keep a list of the medicines, vitamins, and herbs you take  Include the amounts, and when and why you take them  Bring the list or the pill bottles to follow-up visits  Carry your medicine list with you in case of an emergency  Self-care:   Drink liquids as directed  You may need to drink more liquids than usual to stay hydrated  Ask how much liquid to drink each day and which liquids are best for you  Use a cool mist humidifier  to increase air moisture in your home  This may make it easier for you to breathe and help decrease your cough  Get more rest   Rest helps your body to heal  Slowly start to do more each day  Rest when you feel it is needed  Avoid irritants in the air  Avoid chemicals, fumes, and dust  Wear a face mask if you must work around dust or fumes  Stay inside on days when air pollution levels are high  If you have allergies, stay inside when pollen counts are high  Do not use aerosol products, such as spray-on deodorant, bug spray, and hair spray  Do not smoke or be around others who are smoking  Nicotine and other chemicals in cigarettes and cigars can cause lung damage  Ask your healthcare provider for information if you currently smoke and need help to quit  E-cigarettes or smokeless tobacco still contain nicotine  Talk to your healthcare provider before you use these products      Prevent acute bronchitis:       Ask about vaccines you may need  Get a flu vaccine each year as soon as recommended, usually in September or October  Ask your healthcare provider if you should also get a pneumonia or COVID-19 vaccine  Your healthcare provider can tell you if you should also get other vaccines, and when to get them  Prevent the spread of germs  You can decrease your risk for acute bronchitis and other illnesses by doing the following:    Wash your hands often with soap and water  Carry germ-killing hand lotion or gel with you  You can use the lotion or gel to clean your hands when soap and water are not available  Do not touch your eyes, nose, or mouth unless you have washed your hands first     Always cover your mouth when you cough to prevent the spread of germs  It is best to cough into a tissue or your shirt sleeve instead of into your hand  Ask those around you to cover their mouths when they cough  Try to avoid people who have a cold or the flu  If you are sick, stay away from others as much as possible  Follow up with your doctor as directed:  Write down questions you have so you will remember to ask them during your follow-up visits  © Copyright [employer] 2022 Information is for End User's use only and may not be sold, redistributed or otherwise used for commercial purposes  All illustrations and images included in CareNotes® are the copyrighted property of [employer]  or Ted Martinez  The above information is an  only  It is not intended as medical advice for individual conditions or treatments  Talk to your doctor, nurse or pharmacist before following any medical regimen to see if it is safe and effective for you

## 2022-11-30 NOTE — PROGRESS NOTES
[facility]        NAME: Lilliam Poe is a 81 y o  female  : 1950    MRN: [id number]  DATE: 2022  TIME: 12:24 PM    Assessment and Plan   Acute bacterial bronchitis [J20 8, B96 89]  1  Acute bacterial bronchitis  azithromycin (ZITHROMAX) 250 mg tablet    benzonatate (TESSALON) 200 MG capsule            Patient Instructions     May use Sudafed  May use Afrin as needed, but no more than the next 5 days  Follow up with PCP in 3-5 days  Proceed to  ER if symptoms worsen  Chief Complaint     Chief Complaint   Patient presents with   • Cough   • Headache     Body ache         History of Present Illness       Patient did have, 3 months ago  Started with cough and chest congestion 5 days ago, and slowly worsening  Cough  This is a new problem  The current episode started in the past 7 days  The problem has been gradually worsening  The cough is productive of sputum  Associated symptoms include chills, headaches and nasal congestion  Pertinent negatives include no fever  Headache      Review of Systems   Review of Systems   Constitutional: Positive for chills  Negative for fever  HENT: Positive for congestion  Respiratory: Positive for cough  Cardiovascular: Negative  Neurological: Positive for headaches           Current Medications       Current Outpatient Medications:   •  azithromycin (ZITHROMAX) 250 mg tablet, Take 2 tablets today then 1 tablet daily x 4 days, Disp: 6 tablet, Rfl: 0  •  benzonatate (TESSALON) 200 MG capsule, Take 1 capsule (200 mg total) by mouth 3 (three) times a day as needed for cough, Disp: 20 capsule, Rfl: 0  •  atorvastatin (LIPITOR) 10 mg tablet, Take 10 mg by mouth one tablet three times a week , Disp: , Rfl:   •  calcium citrate-vitamin D (CITRACAL+D) 315-200 MG-UNIT per tablet, Take by mouth, Disp: , Rfl:   •  lansoprazole (PREVACID) 15 mg capsule, Take 15 mg by mouth daily, Disp: , Rfl:   •  metoprolol tartrate (LOPRESSOR) 25 mg tablet, Take 25 mg by mouth every 12 (twelve) hours , Disp: , Rfl:   •  minocycline (Minocin) 50 mg capsule, Take 100 mg by mouth daily , Disp: , Rfl:   •  Multiple Vitamin (MULTIVITAMINS PO), Take by mouth, Disp: , Rfl:     Current Allergies     Allergies as of 2022 - Reviewed 2021   Allergen Reaction Noted   • Adhesive [medical tape]  2020            The following portions of the patient's history were reviewed and updated as appropriate: allergies, current medications, past family history, past medical history, past social history, past surgical history and problem list      Past Medical History:   Diagnosis Date   • BRCA1 negative    • BRCA2 negative    • Breast cancer ([facility] [address] ) 10/2004    lt breast lumpectomy   • [facility]    • History of radiation therapy 10/2004    lt breast cancer lumpectomy   • Hyperlipidemia    • Hypertension        Past Surgical History:   Procedure Laterality Date   • BREAST BIOPSY Left     benign   • BREAST LUMPECTOMY Left 10/2004    lt breast cancer   •  SECTION     • COLONOSCOPY     • FOOT ARTHRODESIS, MODIFIED DU     • HYSTERECTOMY Bilateral        • MOHS SURGERY     • OOPHORECTOMY Bilateral    • SKIN CANCER EXCISION      BACK       Family History   Problem Relation Age of Onset   • Congenital heart disease Mother    • Breast cancer Mother [de-identified]   • Anuerysm Father    • Heart attack Brother    • Colon cancer Paternal Grandmother    • Colon cancer Paternal Grandfather    • Heart attack Paternal Aunt    • Heart attack Paternal Uncle    • No Known Problems Sister    • No Known Problems Daughter    • No Known Problems Maternal Grandmother    • No Known Problems Sister    • No Known Problems Daughter    • No Known Problems Daughter    • No Known Problems Maternal Aunt    • No Known Problems Maternal Aunt          Medications have been verified          Objective   /87   Pulse 64   Temp 98 2 °F (36 8 °C)   Resp 18   Wt 55 3 kg (122 lb)   SpO2 98% BMI 22 68 kg/m²   No LMP recorded  Patient has had a hysterectomy  Physical Exam     Physical Exam  Vitals and nursing note reviewed  Constitutional:       Appearance: Normal appearance  She is well-developed and well-nourished  HENT:      Right Ear: Tympanic membrane and external ear normal       Left Ear: Tympanic membrane and external ear normal       Nose: Nose normal       Mouth/Throat:      Mouth: Oropharynx is clear and moist  Mucous membranes are moist       Pharynx: No oropharyngeal exudate  Eyes:      Conjunctiva/sclera: Conjunctivae normal    Cardiovascular:      Rate and Rhythm: Normal rate and regular rhythm  Heart sounds: Normal heart sounds  No murmur heard  Pulmonary:      Effort: Pulmonary effort is normal  No respiratory distress  Breath sounds: Examination of the right-upper field reveals rhonchi  Examination of the left-upper field reveals rhonchi  Rhonchi present  No wheezing or rales  Chest:      Chest wall: No tenderness  Abdominal:      Palpations: Abdomen is soft  Musculoskeletal:         General: Normal range of motion  Cervical back: Normal range of motion and neck supple  Lymphadenopathy:      Cervical: No cervical adenopathy  Skin:     General: Skin is warm  Findings: No erythema or rash  Neurological:      Mental Status: She is alert and oriented to person, place, and time

## 2023-01-03 ENCOUNTER — APPOINTMENT (EMERGENCY)
Dept: RADIOLOGY | Facility: HOSPITAL | Age: 73
End: 2023-01-03

## 2023-01-03 ENCOUNTER — HOSPITAL ENCOUNTER (EMERGENCY)
Facility: HOSPITAL | Age: 73
Discharge: HOME | End: 2023-01-04
Attending: EMERGENCY MEDICINE

## 2023-01-03 DIAGNOSIS — I10 HYPERTENSION: Primary | ICD-10-CM

## 2023-01-03 DIAGNOSIS — R42 DIZZINESS: ICD-10-CM

## 2023-01-03 DIAGNOSIS — R05.9 COUGH: ICD-10-CM

## 2023-01-03 LAB
BASOPHILS # BLD AUTO: 0.04 THOUSANDS/ÂΜL (ref 0–0.1)
BASOPHILS NFR BLD AUTO: 1 % (ref 0–1)
EOSINOPHIL # BLD AUTO: 0.09 THOUSAND/ÂΜL (ref 0–0.61)
EOSINOPHIL NFR BLD AUTO: 2 % (ref 0–6)
ERYTHROCYTE [DISTWIDTH] IN BLOOD BY AUTOMATED COUNT: 13.2 % (ref 11.6–15.1)
HCT VFR BLD AUTO: 41.8 % (ref 34.8–46.1)
HGB BLD-MCNC: 14.6 G/DL (ref 11.5–15.4)
IMM GRANULOCYTES # BLD AUTO: 0.01 THOUSAND/UL (ref 0–0.2)
IMM GRANULOCYTES NFR BLD AUTO: 0 % (ref 0–2)
LYMPHOCYTES # BLD AUTO: 1.33 THOUSANDS/ÂΜL (ref 0.6–4.47)
LYMPHOCYTES NFR BLD AUTO: 26 % (ref 14–44)
MCH RBC QN AUTO: 32.4 PG (ref 26.8–34.3)
MCHC RBC AUTO-ENTMCNC: 34.9 G/DL (ref 31.4–37.4)
MCV RBC AUTO: 93 FL (ref 82–98)
MONOCYTES # BLD AUTO: 0.57 THOUSAND/ÂΜL (ref 0.17–1.22)
MONOCYTES NFR BLD AUTO: 11 % (ref 4–12)
NEUTROPHILS # BLD AUTO: 3.06 THOUSANDS/ÂΜL (ref 1.85–7.62)
NEUTS SEG NFR BLD AUTO: 60 % (ref 43–75)
NRBC BLD AUTO-RTO: 0 /100 WBCS
PLATELET # BLD AUTO: 226 THOUSANDS/UL (ref 149–390)
PMV BLD AUTO: 9.3 FL (ref 8.9–12.7)
RBC # BLD AUTO: 4.51 MILLION/UL (ref 3.81–5.12)
WBC # BLD AUTO: 5.1 THOUSAND/UL (ref 4.31–10.16)

## 2023-01-04 ENCOUNTER — HOSPITAL ENCOUNTER (OUTPATIENT)
Dept: MAMMOGRAPHY | Facility: HOSPITAL | Age: 73
Discharge: HOME | End: 2023-01-04
Attending: INTERNAL MEDICINE

## 2023-01-04 ENCOUNTER — APPOINTMENT (EMERGENCY)
Dept: CT IMAGING | Facility: HOSPITAL | Age: 73
End: 2023-01-04

## 2023-01-04 VITALS
BODY MASS INDEX: 24.54 KG/M2 | DIASTOLIC BLOOD PRESSURE: 85 MMHG | WEIGHT: 132 LBS | HEART RATE: 79 BPM | OXYGEN SATURATION: 96 % | TEMPERATURE: 98.1 F | RESPIRATION RATE: 24 BRPM | SYSTOLIC BLOOD PRESSURE: 141 MMHG

## 2023-01-04 DIAGNOSIS — Z12.31 ENCOUNTER FOR SCREENING MAMMOGRAM FOR MALIGNANT NEOPLASM OF BREAST: ICD-10-CM

## 2023-01-04 LAB
ALBUMIN SERPL BCP-MCNC: 4.7 G/DL (ref 3.5–5)
ALP SERPL-CCNC: 91 U/L (ref 34–104)
ALT SERPL W P-5'-P-CCNC: 30 U/L (ref 7–52)
ANION GAP SERPL CALCULATED.3IONS-SCNC: 11 MMOL/L (ref 4–13)
APTT PPP: 24 SECONDS (ref 23–37)
AST SERPL W P-5'-P-CCNC: 29 U/L (ref 13–39)
ATRIAL RATE: 109 BPM
BILIRUB SERPL-MCNC: 0.54 MG/DL (ref 0.2–1)
BUN SERPL-MCNC: 17 MG/DL (ref 5–25)
CALCIUM SERPL-MCNC: 9.5 MG/DL (ref 8.4–10.2)
CARDIAC TROPONIN I PNL SERPL HS: 4 NG/L
CHLORIDE SERPL-SCNC: 100 MMOL/L (ref 96–108)
CO2 SERPL-SCNC: 25 MMOL/L (ref 21–32)
CREAT SERPL-MCNC: 0.8 MG/DL (ref 0.6–1.3)
GFR SERPL CREATININE-BSD FRML MDRD: 74 ML/MIN/1.73SQ M
GLUCOSE SERPL-MCNC: 102 MG/DL (ref 65–140)
INR PPP: 0.91 (ref 0.84–1.19)
MAGNESIUM SERPL-MCNC: 2.2 MG/DL (ref 1.9–2.7)
P AXIS: 50 DEGREES
POTASSIUM SERPL-SCNC: 3.6 MMOL/L (ref 3.5–5.3)
PR INTERVAL: 156 MS
PROT SERPL-MCNC: 7.1 G/DL (ref 6.4–8.4)
PROTHROMBIN TIME: 12.2 SECONDS (ref 11.6–14.5)
QRS AXIS: 35 DEGREES
QRSD INTERVAL: 82 MS
QT INTERVAL: 344 MS
QTC INTERVAL: 463 MS
SODIUM SERPL-SCNC: 136 MMOL/L (ref 135–147)
T WAVE AXIS: 42 DEGREES
VENTRICULAR RATE: 109 BPM

## 2023-01-04 RX ORDER — ALBUTEROL SULFATE 90 UG/1
2 AEROSOL, METERED RESPIRATORY (INHALATION) ONCE
Status: DISCONTINUED | OUTPATIENT
Start: 2023-01-04 | End: 2023-01-04 | Stop reason: HOSPADM

## 2023-01-04 RX ADMIN — IOHEXOL 85 ML: 350 INJECTION, SOLUTION INTRAVENOUS at 00:20

## 2023-01-04 NOTE — DISCHARGE INSTRUCTIONS
RETURN IF WORSE IN ANY WAY:   WORSENING DIZZINESS  WEAKNESS/NUMBNESS  VISION CHANGES  SPEECH DIFFICULTY  FEVER OR FLU LIKE SYMPTOMS,   NECK STIFFNESS,  VOMITING,  SEIZURE ACTIVITY OR CONFUSION,  OR NEW AND CONCERNING SYMPTOMS SIGNS OR SYMPTOMS      PLEASE CALL YOUR PRIMARY DOCTOR IN THE MORNING TO SET UP FOLLOW UP IN 1-2 DAYS  PLEASE REVIEW THE WORK UP RESULTS WITH YOUR DOCTOR

## 2023-01-04 NOTE — ED PROVIDER NOTES
History  Chief Complaint   Patient presents with   • Hypertension       81YEAR-OLD FEMALE    PMH:  Left Breast Ca in 2004 (in remission after Radiation)  HTN  PVCs      CC:   HTN  dizziness      HPI:  Pt monitors BP daily  SBP has been 150-170's over the past 4 days    Dizziness started just pta      Mode of arrival:   EMS      No h/o similar    Dizziness currently resolved      Pt has no change in vision  No unilateral weakness/numbness  No facial weakness  No difficulty speaking   No trouble walking       Denies Diaphoresis  PATIENT HAS NO COMPLAINTS OF CHEST PAIN OR SHORTNESS OF BREATH  No pleurisy      TRAUMA:  NONE  PATIENT DID NOT FALL OR HIT HER HEAD  SHE IS MOVING ALL EXTREMITIES WITH EQUAL  STRENGTH AND 5/5 STRENGTH THROUGHOUT  Pt has had cough since September/October  Cough is NP  PATIENT DENIES FEVERS, REPORTS CHILLS  NO SINUS SYMPTOMS      She has occasional Headaches over the past couple days, has   Denies NECK PAIN OR Neck pain   SHE DENIES SORE THROAT, DENIES SINUS CONGESTION      VTE  RISK FACTORS:  NONE  NO HISTORY OF PE OR DVT  NO LONG CAR RIDES OR PLANE RIDES  DENIES HEMOPTYSIS  OTHER ASSOCIATED SYMPTOMS:  NO ABDOMINAL PAIN  NO ACUTE BACK PAIN  NO NAUSEA/VOMITING  NO STOOL CHANGES   No diarrhea/loose stools  No back or bloody stools      NO URINARY COMPLAINTS:   NO DYSURIA, NO HEMATURIA, NO FREQUENCY        No other complaints       History provided by:  Patient  Dizziness  Quality:  Unable to specify  Severity:  Moderate  Progression:  Resolved  Chronicity:  New  Relieved by:  Nothing  Worsened by:  Nothing  Ineffective treatments:  None tried  Associated symptoms: no blood in stool, no chest pain, no diarrhea, no headaches, no nausea, no palpitations, no shortness of breath, no syncope, no tinnitus, no vision changes, no vomiting and no weakness    Risk factors: hx of vertigo    Risk factors: no anemia, no heart disease, no hx of stroke, no Meniere's disease and no new medications        Prior to Admission Medications   Prescriptions Last Dose Informant Patient Reported? Taking? Multiple Vitamin (MULTIVITAMINS PO)   Yes No   Sig: Take by mouth   atorvastatin (LIPITOR) 10 mg tablet   Yes No   Sig: Take 10 mg by mouth one tablet three times a week     benzonatate (TESSALON) 200 MG capsule   No No   Sig: Take 1 capsule (200 mg total) by mouth 3 (three) times a day as needed for cough   calcium citrate-vitamin D (CITRACAL+D) 315-200 MG-UNIT per tablet   Yes No   Sig: Take by mouth   lansoprazole (PREVACID) 15 mg capsule   Yes No   Sig: Take 15 mg by mouth daily   metoprolol tartrate (LOPRESSOR) 25 mg tablet   Yes No   Sig: Take 25 mg by mouth every 12 (twelve) hours    minocycline (Minocin) 50 mg capsule   Yes No   Sig: Take 100 mg by mouth daily       Facility-Administered Medications: None       Past Medical History:   Diagnosis Date   • BRCA1 negative    • BRCA2 negative    • Breast cancer ([facility] [address] ) 10/2004    lt breast lumpectomy   • Cancer [facility])    • History of radiation therapy 10/2004    lt breast cancer lumpectomy   • Hyperlipidemia    • Hypertension        Past Surgical History:   Procedure Laterality Date   • BREAST BIOPSY Left     benign   • BREAST LUMPECTOMY Left 10/2004    lt breast cancer   •  SECTION     • COLONOSCOPY     • FOOT ARTHRODESIS, MODIFIED DU     • HYSTERECTOMY Bilateral        • MOHS SURGERY     • OOPHORECTOMY Bilateral    • SKIN CANCER EXCISION      BACK       Family History   Problem Relation Age of Onset   • Congenital heart disease Mother    • Breast cancer Mother [de-identified]   • Anuerysm Father    • Heart attack Brother    • Colon cancer Paternal Grandmother    • Colon cancer Paternal Grandfather    • Heart attack Paternal Aunt    • Heart attack Paternal Uncle    • No Known Problems Sister    • No Known Problems Daughter    • No Known Problems Maternal Grandmother    • No Known Problems Sister    • No Known Problems Daughter    • No Known Problems Daughter    • No Known Problems Maternal Aunt    • No Known Problems Maternal Aunt      I have reviewed and agree with the history as documented  E-Cigarette/Vaping   • E-Cigarette Use Never User      E-Cigarette/Vaping Substances   • Nicotine No    • THC No    • CBD No    • Flavoring No    • Other No    • Unknown No      Social History     Tobacco Use   • Smoking status: Never   • Smokeless tobacco: Never   Vaping Use   • Vaping Use: Never used   Substance Use Topics   • Alcohol use: Yes     Comment: Social   • Drug use: No       Review of Systems   Constitutional: Negative for chills, diaphoresis, fatigue and fever  HENT: Negative for congestion, drooling, ear discharge, ear pain, facial swelling, nosebleeds, rhinorrhea, sinus pressure, sinus pain, sneezing, sore throat, tinnitus, trouble swallowing and voice change  Eyes: Negative for photophobia, pain, discharge, redness, itching and visual disturbance  Respiratory: Negative for cough, shortness of breath, wheezing and stridor  Cardiovascular: Negative for chest pain, palpitations, leg swelling and syncope  Gastrointestinal: Negative for abdominal distention, abdominal pain, anal bleeding, blood in stool, constipation, diarrhea, nausea and vomiting  Genitourinary: Negative for difficulty urinating, dysuria, flank pain, frequency and hematuria  Musculoskeletal: Negative for arthralgias, back pain, gait problem, joint swelling, myalgias, neck pain and neck stiffness  Skin: Negative for rash and wound  Neurological: Positive for dizziness  Negative for tremors, seizures, syncope, facial asymmetry, speech difficulty, weakness, light-headedness, numbness and headaches  All other systems reviewed and are negative  Physical Exam  Physical Exam  Constitutional:       General: She is not in acute distress  Appearance: Normal appearance  She is well-developed  She is not ill-appearing, toxic-appearing or diaphoretic  HENT:      Head: Normocephalic and atraumatic  Right Ear: There is no impacted cerumen  Left Ear: There is no impacted cerumen  Nose: Nose normal  No congestion or rhinorrhea  Mouth/Throat:      Pharynx: No oropharyngeal exudate or posterior oropharyngeal erythema  Eyes:      General: No scleral icterus  Right eye: No discharge  Left eye: No discharge  Extraocular Movements: Extraocular movements intact  Conjunctiva/sclera: Conjunctivae normal       Pupils: Pupils are equal, round, and reactive to light  Neck:      Vascular: No JVD  Trachea: No tracheal deviation  Cardiovascular:      Rate and Rhythm: Normal rate and regular rhythm  Pulses: Normal pulses  Heart sounds: Normal heart sounds  No murmur heard  No friction rub  No gallop  Pulmonary:      Effort: Pulmonary effort is normal  No respiratory distress  Breath sounds: Normal breath sounds  No stridor  No wheezing, rhonchi or rales  Chest:      Chest wall: No tenderness  Abdominal:      General: Bowel sounds are normal  There is no distension  Palpations: Abdomen is soft  There is no mass  Tenderness: There is no abdominal tenderness  There is no right CVA tenderness, left CVA tenderness, guarding or rebound  Hernia: No hernia is present  Musculoskeletal:         General: No swelling, tenderness, deformity or signs of injury  Normal range of motion  Cervical back: Normal range of motion and neck supple  No rigidity or tenderness  Right lower leg: No edema  Left lower leg: No edema  Lymphadenopathy:      Cervical: No cervical adenopathy  Skin:     General: Skin is warm  Capillary Refill: Capillary refill takes less than 2 seconds  Coloration: Skin is not jaundiced or pale  Findings: No bruising, erythema, lesion or rash  Neurological:      General: No focal deficit present        Mental Status: She is alert and oriented to person, place, and time  Mental status is at baseline  Cranial Nerves: No cranial nerve deficit  Sensory: No sensory deficit  Motor: No weakness or abnormal muscle tone  Coordination: Coordination normal    Psychiatric:         Thought Content:  Thought content normal          Judgment: Judgment normal       Comments: Slightly anxious affect          Vital Signs  ED Triage Vitals   Temperature Pulse Respirations Blood Pressure SpO2   01/03/23 2337 01/03/23 2333 01/03/23 2333 01/03/23 2337 01/04/23 0011   98 1 °F (36 7 °C) 99 18 (!) 181/96 96 %      Temp src Heart Rate Source Patient Position - Orthostatic VS BP Location FiO2 (%)   -- 01/03/23 2333 -- -- --    Monitor         Pain Score       --                  Vitals:    01/03/23 2333 01/03/23 2337 01/04/23 0011   BP:  (!) 181/96 141/85   Pulse: 99  79         Visual Acuity      ED Medications  Medications   predniSONE tablet 50 mg (50 mg Oral Not Given 1/4/23 0226)   albuterol (PROVENTIL HFA,VENTOLIN HFA) inhaler 2 puff (2 puffs Inhalation Not Given 1/4/23 0226)   iohexol (OMNIPAQUE) 350 MG/ML injection (SINGLE-DOSE) 85 mL (85 mL Intravenous Given 1/4/23 0020)       Diagnostic Studies  Results Reviewed     Procedure Component Value Units Date/Time    HS Troponin 0hr (reflex protocol) [[id number]]  (Normal) Collected: 01/03/23 2342    Lab Status: Final result Specimen: Blood from Arm, Right Updated: 01/04/23 0013     hs TnI 0hr 4 ng/L     HS Troponin I 2hr [[id number]]     Lab Status: No result Specimen: Blood     HS Troponin I 4hr [[id number]]     Lab Status: No result Specimen: Blood     CMP [[id number]] Collected: 01/03/23 2342    Lab Status: Final result Specimen: Blood from Arm, Right Updated: 01/04/23 0006     Sodium 136 mmol/L      Potassium 3 6 mmol/L      Chloride 100 mmol/L      CO2 25 mmol/L      ANION GAP 11 mmol/L      BUN 17 mg/dL      Creatinine 0 80 mg/dL      Glucose 102 mg/dL      Calcium 9 5 mg/dL      AST 29 U/L      ALT 30 U/L Alkaline Phosphatase 91 U/L      Total Protein 7 1 g/dL      Albumin 4 7 g/dL      Total Bilirubin 0 54 mg/dL      eGFR 74 ml/min/1 73sq m     Narrative:      [place] guidelines for Chronic Kidney Disease (CKD):   •  Stage 1 with normal or high GFR (GFR > 90 mL/min/1 73 square meters)  •  Stage 2 Mild CKD (GFR = 60-89 mL/min/1 73 square meters)  •  Stage 3A Moderate CKD (GFR = 45-59 mL/min/1 73 square meters)  •  Stage 3B Moderate CKD (GFR = 30-44 mL/min/1 73 square meters)  •  Stage 4 Severe CKD (GFR = 15-29 mL/min/1 73 square meters)  •  Stage 5 End Stage CKD (GFR <15 mL/min/1 73 square meters)  Note: GFR calculation is accurate only with a steady state creatinine    Magnesium [[id number]]  (Normal) Collected: 01/03/23 2342    Lab Status: Final result Specimen: Blood from Arm, Right Updated: 01/04/23 0006     Magnesium 2 2 mg/dL     Protime-INR [[id number]]  (Normal) Collected: 01/03/23 2342    Lab Status: Final result Specimen: Blood from Arm, Right Updated: 01/04/23 0005     Protime 12 2 seconds      INR 0 91    APTT [[id number]]  (Normal) Collected: 01/03/23 2342    Lab Status: Final result Specimen: Blood from Arm, Right Updated: 01/04/23 0005     PTT 24 seconds     CBC and differential [[id number]] Collected: 01/03/23 2342    Lab Status: Final result Specimen: Blood from Arm, Right Updated: 01/03/23 2347     WBC 5 10 Thousand/uL      RBC 4 51 Million/uL      Hemoglobin 14 6 g/dL      Hematocrit 41 8 %      MCV 93 fL      MCH 32 4 pg      MCHC 34 9 g/dL      RDW 13 2 %      MPV 9 3 fL      Platelets 840 Thousands/uL      nRBC 0 /100 WBCs      Neutrophils Relative 60 %      Immat GRANS % 0 %      Lymphocytes Relative 26 %      Monocytes Relative 11 %      Eosinophils Relative 2 %      Basophils Relative 1 %      Neutrophils Absolute 3 06 Thousands/µL      Immature Grans Absolute 0 01 Thousand/uL      Lymphocytes Absolute 1 33 Thousands/µL      Monocytes Absolute 0 57 Thousand/µL Eosinophils Absolute 0 09 Thousand/µL      Basophils Absolute 0 04 Thousands/µL                  CTA head and neck with and without contrast   Final Result by Luis Ly MD (01/04 2924)         1  No evidence of acute infarct, intracranial hemorrhage or mass  2   No stenosis, dissection or occlusion of the carotid or vertebral arteries or major vessels of the [place]  Workstation performed: [id number]         XR chest 1 view portable   ED Interpretation by Moe Heart MD (01/04 0106)     EXAM PERFORMED/VIEWS:  XR CHEST PORTABLE         FINDINGS:     Cardiomediastinal silhouette appears unremarkable      The lungs are clear  No pneumothorax or pleural effusion      Visualized osseous structures appear unremarkable for the patient's age      IMPRESSION:     No focal consolidation, pleural effusion, or pneumothorax                             Procedures  Procedures         ED Course  ED Course as of 01/04/23 0326   Tue Jan 03, 2023   2337 Pt seen and evlauated    Here for dizziness and hypertensive urgency  Pt currently stable  CTA head/neck and labs ordered to assess    2357 CBC and differential   Wed Jan 04, 2023   0011 patient is clinically stable    Blood pressure normalized without treatment   0012 Magnesium: 2 2   0012 CMP   0012 CBC and differential   0012 PTT: 24   0012 Protime-INR   0058 CTA NECK AND BRAIN WITH AND WITHOUT CONTRAST         IMPRESSION:        1  No evidence of acute infarct, intracranial hemorrhage or mass  2   No stenosis, dissection or occlusion of the carotid or vertebral arteries or major vessels of the [place]     0106 Pt reassessed    She states Pt feels much much better  She has been back to baseline since arrival     She understands results of work up, no concerning findings  BP normalized w/o tx    She has no signs of sepsis, nor life or limb threatening process    I offered further tx, I offered admission, if she felt ill, or her pain was too great, but she declined  She is ready to manage from home    She is very appreciative of her ED care and is ready to manage from home    She understands return precautions    She will f/u w/ PCP tomorrow     5430 Pt inquired about medications she can try for her cough  I offered short course of steroids, to see if this may help  She is happy w/ this plan                               SBIRT 20yo+    Flowsheet Row Most Recent Value   SBIRT (25 yo +)    In order to provide better care to our patients, we are screening all of our patients for alcohol and drug use  Would it be okay to ask you these screening questions? Yes Filed at: 01/03/2023 2339   Initial Alcohol Screen: US AUDIT-C     1  How often do you have a drink containing alcohol? 0 Filed at: 01/03/2023 2339   2  How many drinks containing alcohol do you have on a typical day you are drinking? 0 Filed at: 01/03/2023 2339   3a  Male UNDER 65: How often do you have five or more drinks on one occasion? 0 Filed at: 01/03/2023 2339   3b  FEMALE Any Age, or MALE 65+: How often do you have 4 or more drinks on one occassion? 0 Filed at: 01/03/2023 2339   Audit-C Score 0 Filed at: 01/03/2023 2339   ALANNAH: How many times in the past year have you    Used an illegal drug or used a prescription medication for non-medical reasons? Never Filed at: 01/03/2023 2339                    MDM    Disposition  Final diagnoses:   Hypertension   Cough   Dizziness     Time reflects when diagnosis was documented in both MDM as applicable and the Disposition within this note     Time User Action Codes Description Comment    1/4/2023  1:07 AM Hodges Gash Add [I10] Hypertension     1/4/2023  1:07 AM Hodges Gash Add [R05 9] Cough     1/4/2023  1:07 AM Hodges Gash Add [R42] Dizziness       ED Disposition     ED Disposition   Discharge    Condition   Stable    Date/Time   Wed Jan 4, 2023  1:07 AM    [address] discharge to home/self care  Follow-up Information     Follow up With Specialties Details Why Contact Info    Bethena Lesches, MD Emergency Medicine, Internal Medicine Call today  [address] [address] [phone]            Discharge Medication List as of 1/4/2023  1:08 AM      CONTINUE these medications which have NOT CHANGED    Details   atorvastatin (LIPITOR) 10 mg tablet Take 10 mg by mouth one tablet three times a week , Historical Med      benzonatate (TESSALON) 200 MG capsule Take 1 capsule (200 mg total) by mouth 3 (three) times a day as needed for cough, Starting Wed 11/30/2022, Normal      calcium citrate-vitamin D (CITRACAL+D) 315-200 MG-UNIT per tablet Take by mouth, Historical Med      lansoprazole (PREVACID) 15 mg capsule Take 15 mg by mouth daily, Historical Med      metoprolol tartrate (LOPRESSOR) 25 mg tablet Take 25 mg by mouth every 12 (twelve) hours , Historical Med      minocycline (Minocin) 50 mg capsule Take 100 mg by mouth daily , Historical Med      Multiple Vitamin (MULTIVITAMINS PO) Take by mouth, Historical Med             No discharge procedures on file      PDMP Review     None          ED Provider  Electronically Signed by           Yuli Espino MD  01/04/23 7124

## 2023-01-04 NOTE — ED PROCEDURE NOTE
PROCEDURE  ECG 12 Lead Documentation Only    Date/Time: 1/3/2023 11:33 PM  Performed by: Boston Tavarez MD  Authorized by: Boston Tavarez MD     Indications / Diagnosis:  Dizziness   Patient location:  ED  Previous ECG:     Comparison to cardiac monitor: Yes    Interpretation:     Interpretation: normal    Rate:     ECG rate:  109    ECG rate assessment: normal    Rhythm:     Rhythm: sinus tachycardia    Ectopy:     Ectopy: none    QRS:     QRS axis:  Normal  Conduction:     Conduction: normal    ST segments:     ST segments:  Non-specific  T waves:     T waves: non-specific           Boston Tavarez MD  01/03/23 6812

## 2023-01-23 ENCOUNTER — ANNUAL EXAM (OUTPATIENT)
Dept: OBGYN CLINIC | Facility: CLINIC | Age: 73
End: 2023-01-23

## 2023-01-23 VITALS
BODY MASS INDEX: 25.34 KG/M2 | WEIGHT: 134.2 LBS | DIASTOLIC BLOOD PRESSURE: 75 MMHG | SYSTOLIC BLOOD PRESSURE: 120 MMHG | HEIGHT: 61 IN

## 2023-01-23 DIAGNOSIS — Z01.419 ENCOUNTER FOR GYNECOLOGICAL EXAMINATION (GENERAL) (ROUTINE) WITHOUT ABNORMAL FINDINGS: Primary | ICD-10-CM

## 2023-01-23 DIAGNOSIS — Z78.0 ENCOUNTER FOR OSTEOPOROSIS SCREENING IN ASYMPTOMATIC POSTMENOPAUSAL PATIENT: ICD-10-CM

## 2023-01-23 DIAGNOSIS — Z13.820 ENCOUNTER FOR OSTEOPOROSIS SCREENING IN ASYMPTOMATIC POSTMENOPAUSAL PATIENT: ICD-10-CM

## 2023-01-23 RX ORDER — AMLODIPINE BESYLATE 5 MG/1
5 TABLET ORAL DAILY
COMMUNITY
Start: 2023-01-05

## 2023-01-23 NOTE — PROGRESS NOTES
ASSESSMENT & PLAN: Pedro Finley was seen today for gynecologic exam     Diagnoses and all orders for this visit:    Encounter for gynecological examination (general) (routine) without abnormal findings    Encounter for osteoporosis screening in asymptomatic postmenopausal patient  -     DXA bone density spine hip and pelvis; Future      1  Routine well woman exam done today  2   Pap and HPV:  Pap and cotesting was not done today  Current ASCCP Guidelines reviewed  3   Mammogram is UTD  4   Colorectal cancer screening is up to date  5   DEXA is not up to date  DEXA was ordered today  6  The following were reviewed in today's visit: adequate intake of calcium and vitamin D, exercise and healthy diet  7   F/u 1-2 years for next routine gyn exam       CC:  Annual Gynecologic Examination    HPI: Taqueria Sanders is a 81 y o  who presents for annual gynecologic examination  She has the following concerns:  No issues  Health Maintenance:    Patient describes her health as excellent  Patient has weight concerns  She exercises 2-3 days per week with gym, skis and walks  She does wear her seatbelt routinely  She does perform regular monthly self breast exams  She does feel safe at home  Patients does not follow a healthy diet  Patient gets 1 servings of dairy or calcium rich foods daily        Last pap: n/a  Last mammogram: 23  Last colorectal cancer screenin colonoscopy  Last DEXA:  osteopenia of hip    Patient Active Problem List   Diagnosis   • Malignant neoplasm of left female breast [facility])   • Benign hypertension   • PVC's (premature ventricular contractions)       Past Medical History:   Diagnosis Date   • BRCA1 negative    • BRCA2 negative    • Breast cancer ([facility] [address] ) 10/2004    lt breast lumpectomy   • Cancer ([facility] [address] )     melanoma on back   • History of radiation therapy 10/2004    lt breast cancer lumpectomy   • Hyperlipidemia    • Hypertension    • Ischemic colitis ([facility] [address] )     secondary to Covid       Past Surgical History:   Procedure Laterality Date   • BREAST BIOPSY Left     benign   • BREAST LUMPECTOMY Left 10/2004    lt breast cancer   •  SECTION     • COLONOSCOPY     • FOOT ARTHRODESIS, MODIFIED DU     • HYSTERECTOMY Bilateral        • MOHS SURGERY      nasal area   • OOPHORECTOMY Bilateral    • SKIN CANCER EXCISION      BACK       Past OB/Gyn History:    Patient is currently sexually active    heterosexual     Family History   Problem Relation Age of Onset   • Congenital heart disease Mother    • Breast cancer Mother [de-identified]   • Anuerysm Father    • Heart attack Brother    • Colon cancer Paternal Grandmother    • Colon cancer Paternal Grandfather    • Heart attack Paternal Aunt    • Heart attack Paternal Uncle    • No Known Problems Sister    • No Known Problems Daughter    • No Known Problems Maternal Grandmother    • No Known Problems Sister    • No Known Problems Daughter    • No Known Problems Daughter    • No Known Problems Maternal Aunt    • No Known Problems Maternal Aunt        Social History:   Social History     Socioeconomic History   • Marital status: /Civil Union     Spouse name: Not on file   • Number of children: Not on file   • Years of education: Not on file   • Highest education level: Not on file   Occupational History   • Not on file   Tobacco Use   • Smoking status: Never   • Smokeless tobacco: Never   Vaping Use   • Vaping Use: Never used   Substance and Sexual Activity   • Alcohol use: Yes     Comment: Social   • Drug use: No   • Sexual activity: Yes     Partners: Male     Birth control/protection: Post-menopausal   Other Topics Concern   • Not on file   Social History Narrative   • Not on file     Social Determinants of Health     Financial Resource Strain: Not on file   Food Insecurity: Not on file   Transportation Needs: Not on file   Physical Activity: Not on file   Stress: Not on file   Social Connections: Not on file   Intimate Partner Violence: Not on file   Housing Stability: Not on file     Presently lives with spouse  Patient is currently retired  Allergies   Allergen Reactions   • Adhesive [Medical Tape]          Current Outpatient Medications:   •  amLODIPine (NORVASC) 5 mg tablet, Take 5 mg by mouth daily, Disp: , Rfl:   •  atorvastatin (LIPITOR) 10 mg tablet, Take 10 mg by mouth one tablet three times a week , Disp: , Rfl:   •  benzonatate (TESSALON) 200 MG capsule, Take 1 capsule (200 mg total) by mouth 3 (three) times a day as needed for cough, Disp: 40 capsule, Rfl: 1  •  calcium citrate-vitamin D (CITRACAL+D) 315-200 MG-UNIT per tablet, Take by mouth, Disp: , Rfl:   •  metoprolol tartrate (LOPRESSOR) 25 mg tablet, Take 25 mg by mouth every 12 (twelve) hours , Disp: , Rfl:   •  minocycline (MINOCIN) 50 mg capsule, Take 100 mg by mouth daily , Disp: , Rfl:   •  Multiple Vitamin (MULTIVITAMINS PO), Take by mouth, Disp: , Rfl:   •  benzonatate (TESSALON) 200 MG capsule, Take 1 capsule (200 mg total) by mouth 3 (three) times a day as needed for cough, Disp: 20 capsule, Rfl: 0  •  lansoprazole (PREVACID) 15 mg capsule, Take 15 mg by mouth daily (Patient not taking: Reported on 1/23/2023), Disp: , Rfl:     Review of Systems  Constitutional :no fever, feels well, no tiredness, recent weight gain  ENT: no ear ache, no loss of hearing, no nosebleeds or nasal discharge, no sore throat or hoarseness  Cardiovascular: no complaints of slow or fast heart beat, no chest pain, no palpitations, no leg claudication or lower extremity edema    Respiratory: no complaints of shortness of shortness of breath, no SMILEY  Breasts:no complaints of breast pain, breast lump, or nipple discharge  Gastrointestinal: no complaints of abdominal pain, constipation, nausea, vomiting, or diarrhea or bloody stools  Genitourinary : no complaints of dysuria, incontinence, pelvic pain, no dysmenorrhea, vaginal discharge or abnormal vaginal bleeding and as noted in HPI   Musculoskeletal: no complaints of arthralgia, no myalgia, no joint swelling or stiffness, no limb pain or swelling  Integumentary: no complaints of skin rash or lesion, itching or dry skin  Neurological: no complaints of headache, no confusion, no numbness or tingling, no dizziness or fainting     Physical Exam:   /75   Ht 5' 1" (1 549 m)   Wt 60 9 kg (134 lb 3 2 oz)   BMI 25 36 kg/m²     General: appears stated age, cooperative, alert normal mood and affect   Psychiatric oriented to person, place and time  Mood and affect normal   Neck: normal, supple,trachea midline, no masses  Thyroid: normal, no thyromegaly   Heart: regular rate and rhythm, S1, S2 normal, no murmur, click, rub or gallop   Lungs: clear to auscultation bilaterally, no increased work of breathing or signs of respiratory distress   Breasts: normal, no dimpling or skin changes noted, left breast with well-healed surgical incisions and evidence of lumpectomy   Abdomen: soft, non-tender, without masses or organomegaly   Vulva: normal , no lesions   Vagina: normal , no lesions, mod atrophy   Urethra: normal   Urethal meatus normal   Bladder Normal, soft, non-tender and no prolapse or masses appreciated   Cervix: Surgically absent    Uterus: Surgically absent   Adnexa: normal, non-tender without fullness or masses   Lymphatic Palpation of lymph nodes in neck, axilla, groin and/or other locations: no lymphadenopathy or masses noted   Skin Normal skin turgor and no rashes    Palpation of skin and subcutaneous tissue normal

## 2023-01-23 NOTE — PATIENT INSTRUCTIONS
Thank you for your confidence in our team    We appreciate you and welcome your feedback  If you receive a survey from us, please take a few moments to let us know how we are doing     Sincerely,   Jena Virk MD

## 2023-05-17 ENCOUNTER — APPOINTMENT (OUTPATIENT)
Dept: LAB | Facility: CLINIC | Age: 73
End: 2023-05-17

## 2023-05-17 DIAGNOSIS — R73.9 HYPERGLYCEMIA: ICD-10-CM

## 2023-05-17 DIAGNOSIS — E78.5 HYPERLIPIDEMIA, UNSPECIFIED HYPERLIPIDEMIA TYPE: ICD-10-CM

## 2023-05-17 DIAGNOSIS — Z79.899 ENCOUNTER FOR LONG-TERM (CURRENT) USE OF OTHER MEDICATIONS: ICD-10-CM

## 2023-05-17 DIAGNOSIS — E55.9 AVITAMINOSIS D: ICD-10-CM

## 2023-05-17 DIAGNOSIS — I10 ESSENTIAL HYPERTENSION, MALIGNANT: ICD-10-CM

## 2023-05-17 LAB
25(OH)D3 SERPL-MCNC: 41.6 NG/ML (ref 30–100)
ALBUMIN SERPL BCP-MCNC: 3.7 G/DL (ref 3.5–5)
ALP SERPL-CCNC: 91 U/L (ref 46–116)
ALT SERPL W P-5'-P-CCNC: 37 U/L (ref 12–78)
ANION GAP SERPL CALCULATED.3IONS-SCNC: -1 MMOL/L (ref 4–13)
AST SERPL W P-5'-P-CCNC: 22 U/L (ref 5–45)
BILIRUB SERPL-MCNC: 0.86 MG/DL (ref 0.2–1)
BUN SERPL-MCNC: 12 MG/DL (ref 5–25)
CALCIUM SERPL-MCNC: 9.4 MG/DL (ref 8.3–10.1)
CHLORIDE SERPL-SCNC: 110 MMOL/L (ref 96–108)
CHOLEST SERPL-MCNC: 233 MG/DL
CO2 SERPL-SCNC: 28 MMOL/L (ref 21–32)
CREAT SERPL-MCNC: 0.94 MG/DL (ref 0.6–1.3)
ERYTHROCYTE [DISTWIDTH] IN BLOOD BY AUTOMATED COUNT: 14.6 % (ref 11.6–15.1)
GFR SERPL CREATININE-BSD FRML MDRD: 61 ML/MIN/1.73SQ M
GLUCOSE P FAST SERPL-MCNC: 91 MG/DL (ref 65–99)
HCT VFR BLD AUTO: 45.2 % (ref 34.8–46.1)
HDLC SERPL-MCNC: 97 MG/DL
HGB BLD-MCNC: 14.9 G/DL (ref 11.5–15.4)
LDLC SERPL CALC-MCNC: 123 MG/DL (ref 0–100)
MCH RBC QN AUTO: 31.9 PG (ref 26.8–34.3)
MCHC RBC AUTO-ENTMCNC: 33 G/DL (ref 31.4–37.4)
MCV RBC AUTO: 97 FL (ref 82–98)
NONHDLC SERPL-MCNC: 136 MG/DL
PLATELET # BLD AUTO: 239 THOUSANDS/UL (ref 149–390)
PMV BLD AUTO: 10.1 FL (ref 8.9–12.7)
POTASSIUM SERPL-SCNC: 4.6 MMOL/L (ref 3.5–5.3)
PROT SERPL-MCNC: 6.8 G/DL (ref 6.4–8.4)
RBC # BLD AUTO: 4.67 MILLION/UL (ref 3.81–5.12)
SODIUM SERPL-SCNC: 137 MMOL/L (ref 135–147)
TRIGL SERPL-MCNC: 65 MG/DL
WBC # BLD AUTO: 3.99 THOUSAND/UL (ref 4.31–10.16)

## 2023-05-18 LAB
EST. AVERAGE GLUCOSE BLD GHB EST-MCNC: 103 MG/DL
HBA1C MFR BLD: 5.2 %

## 2023-10-24 ENCOUNTER — OFFICE VISIT (OUTPATIENT)
Age: 73
End: 2023-10-24
Payer: MEDICARE

## 2023-10-24 VITALS
BODY MASS INDEX: 24.73 KG/M2 | HEART RATE: 64 BPM | OXYGEN SATURATION: 98 % | SYSTOLIC BLOOD PRESSURE: 136 MMHG | DIASTOLIC BLOOD PRESSURE: 76 MMHG | RESPIRATION RATE: 18 BRPM | WEIGHT: 131 LBS | HEIGHT: 61 IN

## 2023-10-24 DIAGNOSIS — K21.9 GASTROESOPHAGEAL REFLUX DISEASE, UNSPECIFIED WHETHER ESOPHAGITIS PRESENT: ICD-10-CM

## 2023-10-24 DIAGNOSIS — K44.9 HIATAL HERNIA: ICD-10-CM

## 2023-10-24 DIAGNOSIS — E78.5 HYPERLIPIDEMIA, UNSPECIFIED HYPERLIPIDEMIA TYPE: ICD-10-CM

## 2023-10-24 DIAGNOSIS — K55.9 ISCHEMIC COLITIS (HCC): Primary | ICD-10-CM

## 2023-10-24 DIAGNOSIS — K59.00 CONSTIPATION, UNSPECIFIED CONSTIPATION TYPE: ICD-10-CM

## 2023-10-24 DIAGNOSIS — I10 BENIGN HYPERTENSION: ICD-10-CM

## 2023-10-24 PROCEDURE — 99204 OFFICE O/P NEW MOD 45 MIN: CPT | Performed by: STUDENT IN AN ORGANIZED HEALTH CARE EDUCATION/TRAINING PROGRAM

## 2023-10-24 RX ORDER — OMEPRAZOLE 20 MG/1
20 CAPSULE, DELAYED RELEASE ORAL DAILY
Qty: 90 CAPSULE | Refills: 3 | Status: SHIPPED | OUTPATIENT
Start: 2023-10-24

## 2023-10-24 NOTE — PROGRESS NOTES
[facility] - Outpatient Consultation  Camila Danielson 72 y.o. female MRN: [id number]  Encounter: [id number]          ASSESSMENT AND PLAN:    77-year-old female with hypertension, history of breast cancer, hyperlipidemia here for 2 episodes of likely ischemic colitis. Records available today are incomplete but appears she had ischemic colitis in 2/2021 while infected with COVID. She then reports an episode 2 weeks ago suggestive of recurrent ischemic colitis though she did not seek medical treatment for this. 1. Ischemic colitis ([address])  Given she has potentially had 2 episodes, will assess for vascular issue supplying abdomen. Recommended that if she has another episode, she should let me know so that we can obtain appropriate work-up at that time. - US abdomen complete with doppler; Future  -Request records from March 2021 colonoscopy. 2. Benign hypertension  She reports her BP is currently controlled on her home regimen and denies any episodes of symptomatic hypotension. Discussed monitoring blood pressure to avoid hypotension as this will exacerbate ischemic colitis    3. Hyperlipidemia, unspecified hyperlipidemia type  Recent LDL over 100, not at goal.  Recommend she take her Lipitor more frequently and repeat lipid panel in a month. Goal LDL at least less than 100  - Lipid panel; Future    4. Gastroesophageal reflux disease, unspecified whether esophagitis present  5. Hiatal hernia  Has reflux every night and has to sleep sitting up. Given persistent symptoms, will resume PPI. - omeprazole (PriLOSEC) 20 mg delayed release capsule; Take 1 capsule (20 mg total) by mouth daily  Dispense: 90 capsule; Refill: 3    6. Constipation, unspecified constipation type  Titrate MiraLAX to regular soft bowel movements.   If this is ineffective, may benefit from prescription laxative.    ______________________________________________________________________    HPI:    Had ischemic colitis in 2/2021 during COVID infection. Had bleeding and diarrhea during that episode and was hospitalized. Had incomplete colonoscopy during that admission. Colonoscopy report and pathology not available. Colonoscopy 9/8/21 with diverticulosis, congested sigmoid mucosa, normal descending. Completed with gastroscope. Had incomplete colonoscopies previously. 2 weeks ago, had another episode of ischemic colitis. Had initial frequent BM and then diarrhea and then progressed to having hematochezia. Does get some discomfort when needing to have BM but no significant abdominal pain. Gets reflux every night    Bowels at baseline are [place] 1, multiple times per day. Uses stool softener. Used to use miralax which was helpful but not sure if she can take it frequently. On BP meds. Bps typically in 231D systolic. Has previosly monitored at home and everything was fine. CT 2/27/21: colonic thickening from splenic flexure through the sigmoid with ifnlammatory stranding and mucosal hyperenhancement. Takes lipitor twice a week. HDL 97  in May.    5/17/2023 CBC and LFTs unremarkable. 11/8/2022 normal TSH      REVIEW OF SYSTEMS:    CONSTITUTIONAL: Denies any fever, chills, rigors, and weight loss. HEENT: No earache or tinnitus. Denies hearing loss or visual disturbances. CARDIOVASCULAR: No chest pain or palpitations. RESPIRATORY: Denies any cough, hemoptysis, shortness of breath or dyspnea on exertion. GASTROINTESTINAL: As noted in the History of Present Illness. GENITOURINARY: No problems with urination. Denies any hematuria or dysuria. NEUROLOGIC: No dizziness or vertigo, denies headaches. MUSCULOSKELETAL: Denies any muscle or joint pain. SKIN: Denies skin rashes or itching. ENDOCRINE: Denies excessive thirst. Denies intolerance to heat or cold. PSYCHOSOCIAL: Denies depression or anxiety. Denies any recent memory loss.        Historical Information   Past Medical History:   Diagnosis Date BRCA1 negative     BRCA2 negative     Breast cancer ([address]) 10/2004    lt breast lumpectomy    Cancer ([address]) 2002    melanoma on back    History of radiation therapy 10/2004    lt breast cancer lumpectomy    Hyperlipidemia     Hypertension     Ischemic colitis ([address])     secondary to Covid     Past Surgical History:   Procedure Laterality Date    BREAST BIOPSY Left     benign    BREAST LUMPECTOMY Left 10/2004    lt breast cancer     SECTION      COLONOSCOPY      FOOT ARTHRODESIS, MODIFIED DU      HYSTERECTOMY Bilateral     2006    MOHS SURGERY      nasal area    OOPHORECTOMY Bilateral 2006    SKIN CANCER EXCISION      BACK     Social History   Social History     Substance and Sexual Activity   Alcohol Use Yes    Comment: Social     Social History     Substance and Sexual Activity   Drug Use No     Social History     Tobacco Use   Smoking Status Never   Smokeless Tobacco Never     Family History   Problem Relation Age of Onset    Congenital heart disease Mother     Breast cancer Mother 80    Anuerysm Father     Heart attack Brother     Colon cancer Paternal Grandmother     Colon cancer Paternal Grandfather     Heart attack Paternal Aunt     Heart attack Paternal Uncle     No Known Problems Sister     No Known Problems Daughter     No Known Problems Maternal Grandmother     No Known Problems Sister     No Known Problems Daughter     No Known Problems Daughter     No Known Problems Maternal Aunt     No Known Problems Maternal Aunt        Meds/Allergies       Current Outpatient Medications:     amLODIPine (NORVASC) 5 mg tablet    atorvastatin (LIPITOR) 10 mg tablet    benzonatate (TESSALON) 200 MG capsule    calcium citrate-vitamin D (CITRACAL+D) 315-200 MG-UNIT per tablet    metoprolol tartrate (LOPRESSOR) 25 mg tablet    minocycline (MINOCIN) 50 mg capsule    Multiple Vitamin (MULTIVITAMINS PO)    benzonatate (TESSALON) 200 MG capsule    lansoprazole (PREVACID) 15 mg capsule    Allergies   Allergen Reactions    Adhesive [Medical Tape]            Objective     Blood pressure 136/76, pulse 64, resp. rate 18, height 5' 1" (1.549 m), weight 59.4 kg (131 lb), SpO2 98 %. Body mass index is 24.75 kg/m². PHYSICAL EXAM:      General Appearance:   Alert, cooperative, no distress   HEENT:   Normocephalic, atraumatic, anicteric. Neck:  Supple, symmetrical, trachea midline   Lungs:   Clear to auscultation bilaterally; no rales, rhonchi or wheezing; respirations unlabored    Heart[de-identified]   Regularly irregular; no murmur, rub, or gallop. Abdomen:   Soft, non-tender, non-distended; normal bowel sounds; no masses, no organomegaly    Genitalia:   Deferred    Rectal:   Deferred    Extremities:  No cyanosis, clubbing or edema    Pulses:  2+ and symmetric    Skin:  No jaundice, rashes, or lesions    Lymph nodes:  No palpable cervical lymphadenopathy        Lab Results:   No visits with results within 1 Day(s) from this visit.    Latest known visit with results is:   Appointment on 05/17/2023   Component Date Value    Sodium 05/17/2023 137     Potassium 05/17/2023 4.6     Chloride 05/17/2023 110 (H)     CO2 05/17/2023 28     ANION GAP 05/17/2023 -1 (L)     BUN 05/17/2023 12     Creatinine 05/17/2023 0.94     Glucose, Fasting 05/17/2023 91     Calcium 05/17/2023 9.4     AST 05/17/2023 22     ALT 05/17/2023 37     Alkaline Phosphatase 05/17/2023 91     Total Protein 05/17/2023 6.8     Albumin 05/17/2023 3.7     Total Bilirubin 05/17/2023 0.86     eGFR 05/17/2023 61     Cholesterol 05/17/2023 233 (H)     Triglycerides 05/17/2023 65     HDL, Direct 05/17/2023 97     LDL Calculated 05/17/2023 123 (H)     Non-HDL-Chol (CHOL-HDL) 05/17/2023 136     Hemoglobin A1C 05/17/2023 5.2     EAG 05/17/2023 103     Vit D, 25-Hydroxy 05/17/2023 41.6     WBC 05/17/2023 3.99 (L)     RBC 05/17/2023 4.67     Hemoglobin 05/17/2023 14.9     Hematocrit 05/17/2023 45.2     MCV 05/17/2023 97     MCH 05/17/2023 31.9     MCHC 05/17/2023 33.0     RDW 05/17/2023 14.6     Platelets 70/30/0893 239     MPV 05/17/2023 10.1          Radiology Results:   No results found.

## 2023-10-30 ENCOUNTER — HOSPITAL ENCOUNTER (OUTPATIENT)
Dept: ULTRASOUND IMAGING | Facility: HOSPITAL | Age: 73
Discharge: HOME | End: 2023-10-30
Attending: STUDENT IN AN ORGANIZED HEALTH CARE EDUCATION/TRAINING PROGRAM
Payer: MEDICARE

## 2023-10-30 ENCOUNTER — HOSPITAL ENCOUNTER (OUTPATIENT)
Dept: RADIOLOGY | Facility: HOSPITAL | Age: 73
Discharge: HOME | End: 2023-10-30
Attending: INTERNAL MEDICINE
Payer: MEDICARE

## 2023-10-30 ENCOUNTER — TELEPHONE (OUTPATIENT)
Age: 73
End: 2023-10-30

## 2023-10-30 ENCOUNTER — HOSPITAL ENCOUNTER (OUTPATIENT)
Dept: BONE DENSITY | Facility: HOSPITAL | Age: 73
Discharge: HOME | End: 2023-10-30
Payer: MEDICARE

## 2023-10-30 VITALS — HEIGHT: 61 IN | WEIGHT: 130.07 LBS | BODY MASS INDEX: 24.56 KG/M2

## 2023-10-30 DIAGNOSIS — K55.9 ISCHEMIC COLITIS (HCC): ICD-10-CM

## 2023-10-30 DIAGNOSIS — Z78.0 ENCOUNTER FOR OSTEOPOROSIS SCREENING IN ASYMPTOMATIC POSTMENOPAUSAL PATIENT: ICD-10-CM

## 2023-10-30 DIAGNOSIS — Z13.820 ENCOUNTER FOR OSTEOPOROSIS SCREENING IN ASYMPTOMATIC POSTMENOPAUSAL PATIENT: ICD-10-CM

## 2023-10-30 PROCEDURE — 72050 X-RAY EXAM NECK SPINE 4/5VWS: CPT

## 2023-10-30 PROCEDURE — 77080 DXA BONE DENSITY AXIAL: CPT

## 2023-10-30 PROCEDURE — 76700 US EXAM ABDOM COMPLETE: CPT

## 2023-10-30 NOTE — TELEPHONE ENCOUNTER
Patients GI provider:  Dr. Fatmata Crocker    Number to return call: [phone]    Reason for call: Pt calling in regard to medication prescribed by PCP for arthritis. Pt concerned medication causes bleeding.       Scheduled procedure/appointment date if applicable: N/A

## 2023-10-31 NOTE — TELEPHONE ENCOUNTER
Meloxicam can increase risk of GI bleeding from ulcers like all NSAID (ibuprofen, aspirin, naproxen) medications can.   Her history of ischemic colitis does not preclude her from taking NSAIDs but she should discuss with her PCP the risks and benefits of taking meloxicam.

## 2023-10-31 NOTE — TELEPHONE ENCOUNTER
Spoke with patient. She said she was seen by Dr. Carman Soulier her pcp and he is starting her on Meloxicam 15mg for arthritis. She is concerned she should be taking this as it says it can cause bleeding? IV discontinued, cath removed intact

## 2023-11-02 NOTE — RESULT ENCOUNTER NOTE
Please notify pt of abnormal result: continued osteopenia on DEXA. Rec pt continue with calcium, vit D supplementation and weight bearing exercises. Repeat in 2 years.

## 2023-11-07 ENCOUNTER — TELEPHONE (OUTPATIENT)
Age: 73
End: 2023-11-07

## 2023-11-07 DIAGNOSIS — K55.9 ISCHEMIC COLITIS (HCC): Primary | ICD-10-CM

## 2023-11-07 NOTE — TELEPHONE ENCOUNTER
Patients GI provider:  Dr. Jennifer Torres    Number to return call: ([phone]    Reason for call: Radiology calling with significant finding for the US abdomen complete with doppler done on 10.30.23    Scheduled procedure/appointment date if applicable: 07.78.83

## 2023-11-13 ENCOUNTER — APPOINTMENT (OUTPATIENT)
Dept: LAB | Facility: CLINIC | Age: 73
End: 2023-11-13
Payer: MEDICARE

## 2023-11-13 DIAGNOSIS — Z79.899 ENCOUNTER FOR LONG-TERM (CURRENT) USE OF OTHER MEDICATIONS: ICD-10-CM

## 2023-11-13 DIAGNOSIS — E55.9 AVITAMINOSIS D: ICD-10-CM

## 2023-11-13 DIAGNOSIS — I10 ESSENTIAL HYPERTENSION, MALIGNANT: ICD-10-CM

## 2023-11-13 DIAGNOSIS — N39.0 URINARY TRACT INFECTION WITHOUT HEMATURIA, SITE UNSPECIFIED: ICD-10-CM

## 2023-11-13 DIAGNOSIS — E78.5 HYPERLIPIDEMIA, UNSPECIFIED HYPERLIPIDEMIA TYPE: ICD-10-CM

## 2023-11-13 DIAGNOSIS — K55.9 ISCHEMIC COLITIS (HCC): ICD-10-CM

## 2023-11-13 LAB
25(OH)D3 SERPL-MCNC: 42 NG/ML (ref 30–100)
ALBUMIN SERPL BCP-MCNC: 4.3 G/DL (ref 3.5–5)
ALP SERPL-CCNC: 78 U/L (ref 34–104)
ALT SERPL W P-5'-P-CCNC: 33 U/L (ref 7–52)
ANION GAP SERPL CALCULATED.3IONS-SCNC: 8 MMOL/L
AST SERPL W P-5'-P-CCNC: 35 U/L (ref 13–39)
BILIRUB SERPL-MCNC: 0.81 MG/DL (ref 0.2–1)
BUN SERPL-MCNC: 14 MG/DL (ref 5–25)
CALCIUM SERPL-MCNC: 9.5 MG/DL (ref 8.4–10.2)
CHLORIDE SERPL-SCNC: 104 MMOL/L (ref 96–108)
CHOLEST SERPL-MCNC: 202 MG/DL
CO2 SERPL-SCNC: 29 MMOL/L (ref 21–32)
CREAT SERPL-MCNC: 0.77 MG/DL (ref 0.6–1.3)
ERYTHROCYTE [DISTWIDTH] IN BLOOD BY AUTOMATED COUNT: 14 % (ref 11.6–15.1)
GFR SERPL CREATININE-BSD FRML MDRD: 77 ML/MIN/1.73SQ M
GLUCOSE P FAST SERPL-MCNC: 86 MG/DL (ref 65–99)
HCT VFR BLD AUTO: 44.1 % (ref 34.8–46.1)
HDLC SERPL-MCNC: 89 MG/DL
HGB BLD-MCNC: 15.1 G/DL (ref 11.5–15.4)
LDLC SERPL CALC-MCNC: 97 MG/DL (ref 0–100)
MCH RBC QN AUTO: 32.3 PG (ref 26.8–34.3)
MCHC RBC AUTO-ENTMCNC: 34.2 G/DL (ref 31.4–37.4)
MCV RBC AUTO: 94 FL (ref 82–98)
NONHDLC SERPL-MCNC: 113 MG/DL
PLATELET # BLD AUTO: 243 THOUSANDS/UL (ref 149–390)
PMV BLD AUTO: 10.8 FL (ref 8.9–12.7)
POTASSIUM SERPL-SCNC: 3.8 MMOL/L (ref 3.5–5.3)
PROT SERPL-MCNC: 6.6 G/DL (ref 6.4–8.4)
RBC # BLD AUTO: 4.67 MILLION/UL (ref 3.81–5.12)
SODIUM SERPL-SCNC: 141 MMOL/L (ref 135–147)
TRIGL SERPL-MCNC: 78 MG/DL
WBC # BLD AUTO: 3.91 THOUSAND/UL (ref 4.31–10.16)

## 2023-11-13 PROCEDURE — 80061 LIPID PANEL: CPT

## 2023-11-13 PROCEDURE — 85027 COMPLETE CBC AUTOMATED: CPT

## 2023-11-13 PROCEDURE — 80053 COMPREHEN METABOLIC PANEL: CPT

## 2023-11-13 PROCEDURE — 87086 URINE CULTURE/COLONY COUNT: CPT

## 2023-11-13 PROCEDURE — 82306 VITAMIN D 25 HYDROXY: CPT

## 2023-11-13 PROCEDURE — 36415 COLL VENOUS BLD VENIPUNCTURE: CPT

## 2023-11-14 LAB — BACTERIA UR CULT: NORMAL

## 2023-11-15 ENCOUNTER — HOSPITAL ENCOUNTER (OUTPATIENT)
Dept: CT IMAGING | Facility: HOSPITAL | Age: 73
Discharge: HOME | End: 2023-11-15
Attending: STUDENT IN AN ORGANIZED HEALTH CARE EDUCATION/TRAINING PROGRAM
Payer: MEDICARE

## 2023-11-15 DIAGNOSIS — K55.9 ISCHEMIC COLITIS (HCC): ICD-10-CM

## 2023-11-15 PROCEDURE — G1004 CDSM NDSC: HCPCS

## 2023-11-15 PROCEDURE — 74174 CTA ABD&PLVS W/CONTRAST: CPT

## 2023-11-15 RX ADMIN — IOHEXOL 100 ML: 350 INJECTION, SOLUTION INTRAVENOUS at 14:25

## 2024-02-27 ENCOUNTER — OFFICE VISIT (OUTPATIENT)
Age: 74
End: 2024-02-27
Payer: MEDICARE

## 2024-02-27 VITALS
OXYGEN SATURATION: 99 % | HEART RATE: 66 BPM | TEMPERATURE: 97.2 F | BODY MASS INDEX: 24.77 KG/M2 | HEIGHT: 61 IN | SYSTOLIC BLOOD PRESSURE: 128 MMHG | DIASTOLIC BLOOD PRESSURE: 62 MMHG | WEIGHT: 131.2 LBS

## 2024-02-27 DIAGNOSIS — Z12.11 COLON CANCER SCREENING: ICD-10-CM

## 2024-02-27 DIAGNOSIS — K21.9 GASTROESOPHAGEAL REFLUX DISEASE, UNSPECIFIED WHETHER ESOPHAGITIS PRESENT: Primary | ICD-10-CM

## 2024-02-27 DIAGNOSIS — K55.9 ISCHEMIC COLITIS (HCC): ICD-10-CM

## 2024-02-27 PROCEDURE — 99214 OFFICE O/P EST MOD 30 MIN: CPT | Performed by: STUDENT IN AN ORGANIZED HEALTH CARE EDUCATION/TRAINING PROGRAM

## 2024-02-27 RX ORDER — FAMOTIDINE 20 MG/1
20 TABLET, FILM COATED ORAL 2 TIMES DAILY PRN
Qty: 180 TABLET | Refills: 3 | Status: SHIPPED | OUTPATIENT
Start: 2024-02-27

## 2024-02-27 RX ORDER — OMEPRAZOLE 40 MG/1
40 CAPSULE, DELAYED RELEASE ORAL DAILY
Qty: 90 CAPSULE | Refills: 3 | Status: SHIPPED | OUTPATIENT
Start: 2024-02-27

## 2024-02-27 NOTE — PROGRESS NOTES
[facility] - Outpatient Follow-up Note  Jazmín Mccollum 73 y.o. female MRN: [id number]  Encounter: [id number]          ASSESSMENT AND PLAN:    73-year-old female with hypertension, history of breast cancer, hyperlipidemia here for follow up of ischemic colitis and GERD.  LDL 97.    1. Gastroesophageal reflux disease, unspecified whether esophagitis present  Symptoms improved but not fully controlled on omeprazole 20 mg daily.  Will escalate to 40 mg and add famotidine for breakthrough  - omeprazole (PriLOSEC) 40 MG capsule; Take 1 capsule (40 mg total) by mouth daily  Dispense: 90 capsule; Refill: 3  - famotidine (PEPCID) 20 mg tablet; Take 1 tablet (20 mg total) by mouth 2 (two) times a day as needed for heartburn  Dispense: 180 tablet; Refill: 3    2. Ischemic colitis (HCC)  Patent vasculature, HLD controlled.    3. Colon cancer screening  Colonoscopy 2021 completed with gastroscope. Rec 5 year follow up per procedure report    ______________________________________________________________________    SUBJECTIVE:    Finds the omeprazole is helping a lot.  Gets breakthrough burning most every day with dietary.  Had one episode of dysphagia but nothing recently     Stool has been melon ball like. Takes miralax.  When taking every day, has BM but has nad some episodes of increased urgency.    CTA 11/15/23  Widely patent mesenteric vasculature.  Fibromuscular dysplasia involving the distal right renal artery.  Diverticulosis.     US 10/30/23  The portal vein is patent. If there is concern for arterial insufficiency of the colon, CTA may be helpful if clinically indicated.  Mild increased echogenicity liver could be related to mild fatty infiltration.  Echogenic lesion in the liver is somewhat ill-defined and of indeterminate etiology. An atypical hemangioma is possible. MRI is suggested for further evaluation.     Colonoscopy 9/8/2021 sigmoid diverticula, internal hemorrhoids incomplete  colonoscopy due to fixed tortuous sigmoid    REVIEW OF SYSTEMS IS OTHERWISE NEGATIVE.      Historical Information   Past Medical History:   Diagnosis Date    BRCA1 negative     BRCA2 negative     Breast cancer (HCC) 10/2004    lt breast lumpectomy    Cancer (HCC)     melanoma on back    History of radiation therapy 10/2004    lt breast cancer lumpectomy    Hyperlipidemia     Hypertension     Ischemic colitis (HCC)     secondary to Covid     Past Surgical History:   Procedure Laterality Date    BREAST BIOPSY Left     benign    BREAST LUMPECTOMY Left 10/2004    lt breast cancer     SECTION      COLONOSCOPY      FOOT ARTHRODESIS, MODIFIED DU      HYSTERECTOMY Bilateral         MOHS SURGERY      nasal area    OOPHORECTOMY Bilateral 2006    SKIN CANCER EXCISION      BACK     Social History   Social History     Substance and Sexual Activity   Alcohol Use Yes    Comment: Social     Social History     Substance and Sexual Activity   Drug Use No     Social History     Tobacco Use   Smoking Status Never   Smokeless Tobacco Never     Family History   Problem Relation Age of Onset    Congenital heart disease Mother     Breast cancer Mother 80    Anuerysm Father     Heart attack Brother     Colon cancer Paternal Grandmother     Colon cancer Paternal Grandfather     Heart attack Paternal Aunt     Heart attack Paternal Uncle     No Known Problems Sister     No Known Problems Daughter     No Known Problems Maternal Grandmother     No Known Problems Sister     No Known Problems Daughter     No Known Problems Daughter     No Known Problems Maternal Aunt     No Known Problems Maternal Aunt        Meds/Allergies       Current Outpatient Medications:     amLODIPine (NORVASC) 5 mg tablet    atorvastatin (LIPITOR) 10 mg tablet    benzonatate (TESSALON) 200 MG capsule    benzonatate (TESSALON) 200 MG capsule    calcium citrate-vitamin D (CITRACAL+D) 315-200 MG-UNIT per tablet    metoprolol tartrate (LOPRESSOR) 25  mg tablet    minocycline (MINOCIN) 50 mg capsule    Multiple Vitamin (MULTIVITAMINS PO)    omeprazole (PriLOSEC) 20 mg delayed release capsule    Allergies   Allergen Reactions    Adhesive [Medical Tape]            Objective     There were no vitals taken for this visit. There is no height or weight on file to calculate BMI.      PHYSICAL EXAM:      General Appearance:   Alert, cooperative, no distress   HEENT:   Normocephalic, atraumatic, anicteric.     Neck:  Supple, symmetrical, trachea midline   Lungs:   Clear to auscultation bilaterally; no rales, rhonchi or wheezing; respirations unlabored    Heart::   Regular rate and rhythm; no murmur, rub, or gallop.   Abdomen:   Soft, non-tender, non-distended; normal bowel sounds; no masses, no organomegaly    Genitalia:   Deferred    Rectal:   Deferred    Extremities:  No cyanosis, clubbing or edema    Pulses:  2+ and symmetric    Skin:  No jaundice, rashes, or lesions    Lymph nodes:  No palpable cervical lymphadenopathy        Lab Results:   No visits with results within 1 Day(s) from this visit.   Latest known visit with results is:   Appointment on 11/13/2023   Component Date Value    Cholesterol 11/13/2023 202 (H)     Triglycerides 11/13/2023 78     HDL, Direct 11/13/2023 89     LDL Calculated 11/13/2023 97     Non-HDL-Chol (CHOL-HDL) 11/13/2023 113     Sodium 11/13/2023 141     Potassium 11/13/2023 3.8     Chloride 11/13/2023 104     CO2 11/13/2023 29     ANION GAP 11/13/2023 8     BUN 11/13/2023 14     Creatinine 11/13/2023 0.77     Glucose, Fasting 11/13/2023 86     Calcium 11/13/2023 9.5     AST 11/13/2023 35     ALT 11/13/2023 33     Alkaline Phosphatase 11/13/2023 78     Total Protein 11/13/2023 6.6     Albumin 11/13/2023 4.3     Total Bilirubin 11/13/2023 0.81     eGFR 11/13/2023 77     Vit D, 25-Hydroxy 11/13/2023 42.0     Urine Culture 11/13/2023 No Growth <1000 cfu/mL     WBC 11/13/2023 3.91 (L)     RBC 11/13/2023 4.67     Hemoglobin 11/13/2023 15.1      Hematocrit 11/13/2023 44.1     MCV 11/13/2023 94     MCH 11/13/2023 32.3     MCHC 11/13/2023 34.2     RDW 11/13/2023 14.0     Platelets 11/13/2023 243     MPV 11/13/2023 10.8          Radiology Results:   No results found.Answers submitted by the patient for this visit:  Abdominal Pain Questionnaire (Submitted on 2/25/2024)  Chief Complaint: Abdominal pain  Progression since onset: resolved  frequency: Yes  Diagnostic workup: ultrasound

## 2024-06-05 ENCOUNTER — APPOINTMENT (OUTPATIENT)
Dept: LAB | Facility: CLINIC | Age: 74
End: 2024-06-05
Payer: MEDICARE

## 2024-06-05 DIAGNOSIS — I10 ESSENTIAL HYPERTENSION, MALIGNANT: ICD-10-CM

## 2024-06-05 DIAGNOSIS — E78.5 HYPERLIPIDEMIA, UNSPECIFIED HYPERLIPIDEMIA TYPE: ICD-10-CM

## 2024-06-05 DIAGNOSIS — E55.9 VITAMIN D DEFICIENCY DISEASE: ICD-10-CM

## 2024-06-05 DIAGNOSIS — R73.9 HYPERGLYCEMIA: ICD-10-CM

## 2024-06-05 DIAGNOSIS — E03.9 ACQUIRED HYPOTHYROIDISM: ICD-10-CM

## 2024-06-05 DIAGNOSIS — Z79.899 ENCOUNTER FOR LONG-TERM (CURRENT) USE OF OTHER MEDICATIONS: ICD-10-CM

## 2024-06-05 LAB
25(OH)D3 SERPL-MCNC: 52.2 NG/ML (ref 30–100)
ALBUMIN SERPL BCP-MCNC: 4.3 G/DL (ref 3.5–5)
ALP SERPL-CCNC: 90 U/L (ref 34–104)
ALT SERPL W P-5'-P-CCNC: 33 U/L (ref 7–52)
ANION GAP SERPL CALCULATED.3IONS-SCNC: 10 MMOL/L (ref 4–13)
AST SERPL W P-5'-P-CCNC: 35 U/L (ref 13–39)
BILIRUB SERPL-MCNC: 0.82 MG/DL (ref 0.2–1)
BUN SERPL-MCNC: 19 MG/DL (ref 5–25)
CALCIUM SERPL-MCNC: 9.4 MG/DL (ref 8.4–10.2)
CHLORIDE SERPL-SCNC: 102 MMOL/L (ref 96–108)
CHOLEST SERPL-MCNC: 203 MG/DL
CO2 SERPL-SCNC: 28 MMOL/L (ref 21–32)
CREAT SERPL-MCNC: 0.73 MG/DL (ref 0.6–1.3)
ERYTHROCYTE [DISTWIDTH] IN BLOOD BY AUTOMATED COUNT: 13.8 % (ref 11.6–15.1)
EST. AVERAGE GLUCOSE BLD GHB EST-MCNC: 108 MG/DL
GFR SERPL CREATININE-BSD FRML MDRD: 83 ML/MIN/1.73SQ M
GLUCOSE P FAST SERPL-MCNC: 84 MG/DL (ref 65–99)
HBA1C MFR BLD: 5.4 %
HCT VFR BLD AUTO: 43.5 % (ref 34.8–46.1)
HDLC SERPL-MCNC: 92 MG/DL
HGB BLD-MCNC: 14.6 G/DL (ref 11.5–15.4)
LDLC SERPL CALC-MCNC: 99 MG/DL (ref 0–100)
MCH RBC QN AUTO: 31.5 PG (ref 26.8–34.3)
MCHC RBC AUTO-ENTMCNC: 33.6 G/DL (ref 31.4–37.4)
MCV RBC AUTO: 94 FL (ref 82–98)
NONHDLC SERPL-MCNC: 111 MG/DL
PLATELET # BLD AUTO: 243 THOUSANDS/UL (ref 149–390)
PMV BLD AUTO: 10.6 FL (ref 8.9–12.7)
POTASSIUM SERPL-SCNC: 4.1 MMOL/L (ref 3.5–5.3)
PROT SERPL-MCNC: 6.5 G/DL (ref 6.4–8.4)
RBC # BLD AUTO: 4.63 MILLION/UL (ref 3.81–5.12)
SODIUM SERPL-SCNC: 140 MMOL/L (ref 135–147)
T4 FREE SERPL-MCNC: 0.81 NG/DL (ref 0.61–1.12)
TRIGL SERPL-MCNC: 61 MG/DL
TSH SERPL DL<=0.05 MIU/L-ACNC: 3.76 UIU/ML (ref 0.45–4.5)
WBC # BLD AUTO: 3.64 THOUSAND/UL (ref 4.31–10.16)

## 2024-06-05 PROCEDURE — 80061 LIPID PANEL: CPT

## 2024-06-05 PROCEDURE — 83036 HEMOGLOBIN GLYCOSYLATED A1C: CPT

## 2024-06-05 PROCEDURE — 85027 COMPLETE CBC AUTOMATED: CPT

## 2024-06-05 PROCEDURE — 84439 ASSAY OF FREE THYROXINE: CPT

## 2024-06-05 PROCEDURE — 80053 COMPREHEN METABOLIC PANEL: CPT

## 2024-06-05 PROCEDURE — 84443 ASSAY THYROID STIM HORMONE: CPT

## 2024-06-05 PROCEDURE — 82306 VITAMIN D 25 HYDROXY: CPT

## 2024-06-05 PROCEDURE — 36415 COLL VENOUS BLD VENIPUNCTURE: CPT

## 2024-06-25 ENCOUNTER — HOSPITAL ENCOUNTER (OUTPATIENT)
Dept: MRI IMAGING | Facility: HOSPITAL | Age: 74
Discharge: HOME | End: 2024-06-25
Attending: INTERNAL MEDICINE
Payer: MEDICARE

## 2024-06-25 DIAGNOSIS — M54.12 RADICULOPATHY, CERVICAL REGION: ICD-10-CM

## 2024-06-25 PROCEDURE — 72141 MRI NECK SPINE W/O DYE: CPT

## 2024-06-27 ENCOUNTER — HOSPITAL ENCOUNTER (OUTPATIENT)
Dept: MAMMOGRAPHY | Facility: HOSPITAL | Age: 74
End: 2024-06-27
Attending: INTERNAL MEDICINE
Payer: MEDICARE

## 2024-06-27 DIAGNOSIS — C50.112 MALIGNANT NEOPLASM OF CENTRAL PORTION OF LEFT FEMALE BREAST, UNSPECIFIED ESTROGEN RECEPTOR STATUS (HCC): ICD-10-CM

## 2024-06-27 PROCEDURE — 77063 BREAST TOMOSYNTHESIS BI: CPT

## 2024-06-27 PROCEDURE — 77067 SCR MAMMO BI INCL CAD: CPT

## 2024-09-02 NOTE — PROGRESS NOTES
PT Evaluation     Today's date: 9/3/2024  Patient name: Jazmín Mccollum  : 1950  MRN: [id number]  Referring provider: Canelo Rico MD  Dx:   Encounter Diagnosis     ICD-10-CM    1. Radiculopathy, cervical region  M54.12                      Assessment  Impairments: abnormal or restricted ROM, activity intolerance, impaired physical strength, lacks appropriate home exercise program, pain with function, poor posture  and activity limitations  Other impairment: decreased flexibility    Assessment details: The patient is a 75 y/o female who presents to PT with diagnosis of cervical radiculopathy.  She has complaints of neck pain along with N&T into BUE though L > R arm.  Her shoulder ROM is WFL.  She demonstrates deficits with decreased C/S ROM and strength, slight decreased shoulder strength, decreased flexibility, decreased postural awareness and muscle tightness.  These deficits lead to difficulty sleeping, difficulty reaching (OH, out to side, across her body and behind her back), difficulty lifting/carrying items and decreased tolerance to functional activities.  She notes difficulty sleeping and pain can wake her up overnight or she has a hard time finding a comfortable position to sleep.  She is still able to complete all her daily activities though has pain with doing them and at times they will trigger radicular symptoms.  The patient would benefit from continued PT to address deficits and improve function.  Tx to include ROM, stretching, strengthening, modalities, HEP, pt education, postural ed, lifting/body mechanics, neuro re-ed, balance/proprioception Te, MT and equipment.      Understanding of Dx/Px/POC: good     Prognosis: good    Goals  STGs:  1.  Initiate and complete HEP with verbal cues.  2.  Improve C/S ROM by 5-10 degrees in 4 weeks.  3.  Patient to report decreased radicular symptoms in 4 weeks.  4.  Decrease C/S pain by > 25% in 4 weeks.  LTGs:  1.  Patient to be I with HEP in 12  weeks.  2.  Improve C/S ROM to WNL t/o in 12 weeks to improve function.  3.  Decrease neck pain to < or = to 2-3/10 with activity in 12 weeks to improve function.  3.  Improve BUE strength to 5/5 t/o in 12 weeks to improve function.  4.  Postural control is improved to maximal level of function in 12 weeks.  5.  Recreational performance is improved to PLOF in 12 weeks.  6.  ADL performance is improved to PLOF in 12 weeks.  7.  Patient to report improved sleep in 12 weeks.        Plan  Patient would benefit from: skilled physical therapy  Planned modality interventions: cryotherapy and thermotherapy: hydrocollator packs    Planned therapy interventions: IASTM, body mechanics training, manual therapy, nerve gliding, neuromuscular re-education, patient/caregiver education, postural training, self care, strengthening, stretching, therapeutic activities, therapeutic exercise, flexibility, functional ROM exercises and home exercise program    Frequency: 2x week  Duration in weeks: 12  Plan of Care beginning date: 9/3/2024  Plan of Care expiration date: 11/26/2024  Treatment plan discussed with: patient  Plan details: Modalities and therapy interventions prn.        Subjective Evaluation    History of Present Illness  Mechanism of injury: The patient states that she has h/o chronic neck pain.  Then she noticed that she was getting numbness and tingling into B UE though L > R arm.    She had seen her PCP, she had x-rays taken in October 2023.        She had an MRI on 6/25/24.   The patient had seen the doctor on 7/17/24.  She was referred to OPPT and she now presents for her evaluation.  She was not able to attend therapy this summer, has now started.  She will be going back to see the doctor later this year for her follow up appointment (sees him every six months).      From EMR of MRI from 6/25:  IMPRESSION:   Multilevel cervical spondylitic degenerative change. Only minor canal stenosis without cord compression.  "Multilevel foraminal narrowing most pronounced at C5-6 on the right.    The patient states that she continues to get numbness and tingling into B UE (can happen daily).  This can go down to her hand.  Also with throbbing into L wrist and forearm.  She has also been seeing a chiropractor.  Typically sees him once a month, has an appointment at the end of September.    She also has complaints of neck pain and her neck feels tired.  She also notes cracking with rotating her neck.    Notes difficulty sleeping 2* pain.      Patient Goals  Patient goals for therapy: increased motion, decreased pain and increased strength  Patient goal: \"To get rid of the symptoms into my arm, to have less pain.\"  Pain  At best pain ratin  At worst pain ratin  Location: Neck - radicular symptoms into B UE.  Quality: dull ache, discomfort, tight and throbbing  Relieving factors: medications  Aggravating factors: lifting (Carrying items)    Social Support  Lives with: spouse    Employment status: not working  Hand dominance: right      Diagnostic Tests  Abnormal MRI: See Above.  Treatments  Previous treatment: chiropractic      Objective     Concurrent Complaints  Positive for disturbed sleep (throbbing in wrist will waker her up) and headaches (h/o headaches). Negative for dizziness    Static Posture     Head  Forward.    Shoulders  Rounded.    Postural Observations  Seated posture: fair  Standing posture: fair  Correction of posture: has no consistent effect      Palpation   Left   Hypertonic in the cervical paraspinals, levator scapulae, suboccipitals and upper trapezius.     Right   Hypertonic in the cervical paraspinals, levator scapulae, suboccipitals and upper trapezius.     Neurological Testing     Sensation   Cervical/Thoracic   Left   Intact: light touch    Right   Intact: light touch    Active Range of Motion   Cervical/Thoracic Spine       Cervical    Flexion: 45 degrees   Extension: 40 degrees      Left lateral flexion: " 40 degrees      Right lateral flexion: 40 degrees      Left rotation: 60 degrees  Right rotation: 70 degrees     Left Shoulder   Flexion: 180 degrees   Abduction: 180 degrees   External rotation 90°: 90 degrees   Internal rotation 90°: 55 degrees     Right Shoulder   Flexion: 180 degrees   Abduction: 180 degrees   External rotation 90°: 90 degrees  Internal rotation 90°: 55 degrees     Left Elbow   Flexion: WFL  Extension: WFL    Right Elbow   Flexion: WFL  Extension: WFL    Strength/Myotome Testing     Left Shoulder     Planes of Motion   Flexion: 4+   Abduction: 4+   External rotation at 0°: WFL   Internal rotation at 0°: WFL     Right Shoulder     Planes of Motion   Flexion: 4+   Abduction: 4+   External rotation at 0°: WFL   Internal rotation at 0°: WFL     Left Elbow   Flexion: WFL  Extension: WFL    Right Elbow   Flexion: WFL  Extension: WFL    Tests   Cervical   Positive neck flexor muscle endurance test.  Negative vertical compression and cervical distraction.     Lumbar   Negative vertical compression.   Neuro Exam:     Headaches   Patient reports headaches: Yes (h/o headaches).                 Precautions: h/o breast cancer, h/o melanoma, HTN       Manuals 9/3       STM vs IASTM to B UT and periscapular muscles                                Neuro Re-Ed         MTP/LTP        Ball Roll on Wall        Yovanny ER w/TBand                                        Ther Ex        UBE Alt for strength        UT Stretch HEP       Levator Stretch HEP       Doorway Stretch        Supine Punch        S/L ER & Abd        Prone Flex/Ext/Rows        Cerv Snag w/towel HEP       C/S Ext with towel        C/S Retraction        C/S Ret, Ext                Ther Activity                        Gait Training                        Modalities        HP/CP prn                          Access Code: [email or web address]  URL: [email or web address]  Date: 09/03/2024  Prepared by: Little Collaoz  - Seated Upper Trapezius Stretch  -  "1 x daily - 7 x weekly - 1 sets - 4 reps - 20\" hold  - Seated Levator Scapulae Stretch  - 1 x daily - 7 x weekly - 1 sets - 4 reps - 20\" hold  - Seated Assisted Cervical Rotation with Towel  - 1 x daily - 7 x weekly - 1 sets - 10 reps - 10\" hold  "

## 2024-09-03 ENCOUNTER — EVALUATION (OUTPATIENT)
Dept: PHYSICAL THERAPY | Facility: CLINIC | Age: 74
End: 2024-09-03
Payer: MEDICARE

## 2024-09-03 DIAGNOSIS — M54.12 RADICULOPATHY, CERVICAL REGION: Primary | ICD-10-CM

## 2024-09-03 PROCEDURE — 97162 PT EVAL MOD COMPLEX 30 MIN: CPT | Performed by: PHYSICAL THERAPIST

## 2024-09-03 PROCEDURE — 97535 SELF CARE MNGMENT TRAINING: CPT | Performed by: PHYSICAL THERAPIST

## 2024-09-03 NOTE — LETTER
September 3, 2024    Canelo Rico MD  [address]  [place] [address]    Patient: Jazmín Mccollum   YOB: 1950   Date of Visit: 9/3/2024     Encounter Diagnosis     ICD-10-CM    1. Radiculopathy, cervical region  M54.12           Dear Dr. Rico:    Thank you for your recent referral of Jazmín Mccollum. Please review the attached evaluation summary from Jazmín's recent visit.     Please verify that you agree with the plan of care by signing the attached order.     If you have any questions or concerns, please do not hesitate to call.     I sincerely appreciate the opportunity to share in the care of one of your patients and hope to have another opportunity to work with you in the near future.       Sincerely,    Little Butler, PT      Referring Provider:      I certify that I have read the below Plan of Care and certify the need for these services furnished under this plan of treatment while under my care.                    Canelo Rico MD  [address]  [place] [address]  Via Fax: [phone]          PT Evaluation     Today's date: 9/3/2024  Patient name: Jazmín Mccollum  : 1950  MRN: [id number]  Referring provider: Canelo Rico MD  Dx:   Encounter Diagnosis     ICD-10-CM    1. Radiculopathy, cervical region  M54.12                      Assessment  Impairments: abnormal or restricted ROM, activity intolerance, impaired physical strength, lacks appropriate home exercise program, pain with function, poor posture  and activity limitations  Other impairment: decreased flexibility    Assessment details: The patient is a 73 y/o female who presents to PT with diagnosis of cervical radiculopathy.  She has complaints of neck pain along with N&T into BUE though L > R arm.  Her shoulder ROM is WFL.  She demonstrates deficits with decreased C/S ROM and strength, slight decreased shoulder strength, decreased flexibility, decreased postural awareness and muscle  tightness.  These deficits lead to difficulty sleeping, difficulty reaching (OH, out to side, across her body and behind her back), difficulty lifting/carrying items and decreased tolerance to functional activities.  She notes difficulty sleeping and pain can wake her up overnight or she has a hard time finding a comfortable position to sleep.  She is still able to complete all her daily activities though has pain with doing them and at times they will trigger radicular symptoms.  The patient would benefit from continued PT to address deficits and improve function.  Tx to include ROM, stretching, strengthening, modalities, HEP, pt education, postural ed, lifting/body mechanics, neuro re-ed, balance/proprioception Te, MT and equipment.      Understanding of Dx/Px/POC: good     Prognosis: good    Goals  STGs:  1.  Initiate and complete HEP with verbal cues.  2.  Improve C/S ROM by 5-10 degrees in 4 weeks.  3.  Patient to report decreased radicular symptoms in 4 weeks.  4.  Decrease C/S pain by > 25% in 4 weeks.  LTGs:  1.  Patient to be I with HEP in 12 weeks.  2.  Improve C/S ROM to WNL t/o in 12 weeks to improve function.  3.  Decrease neck pain to < or = to 2-3/10 with activity in 12 weeks to improve function.  3.  Improve BUE strength to 5/5 t/o in 12 weeks to improve function.  4.  Postural control is improved to maximal level of function in 12 weeks.  5.  Recreational performance is improved to PLOF in 12 weeks.  6.  ADL performance is improved to PLOF in 12 weeks.  7.  Patient to report improved sleep in 12 weeks.        Plan  Patient would benefit from: skilled physical therapy  Planned modality interventions: cryotherapy and thermotherapy: hydrocollator packs    Planned therapy interventions: IASTM, body mechanics training, manual therapy, nerve gliding, neuromuscular re-education, patient/caregiver education, postural training, self care, strengthening, stretching, therapeutic activities, therapeutic  "exercise, flexibility, functional ROM exercises and home exercise program    Frequency: 2x week  Duration in weeks: 12  Plan of Care beginning date: 9/3/2024  Plan of Care expiration date: 2024  Treatment plan discussed with: patient  Plan details: Modalities and therapy interventions prn.        Subjective Evaluation    History of Present Illness  Mechanism of injury: The patient states that she has h/o chronic neck pain.  Then she noticed that she was getting numbness and tingling into B UE though L > R arm.    She had seen her PCP, she had x-rays taken in 2023.        She had an MRI on 24.   The patient had seen the doctor on 24.  She was referred to OPPT and she now presents for her evaluation.  She was not able to attend therapy this summer, has now started.  She will be going back to see the doctor later this year for her follow up appointment (sees him every six months).      From EMR of MRI from :  IMPRESSION:   Multilevel cervical spondylitic degenerative change. Only minor canal stenosis without cord compression. Multilevel foraminal narrowing most pronounced at C5-6 on the right.    The patient states that she continues to get numbness and tingling into B UE (can happen daily).  This can go down to her hand.  Also with throbbing into L wrist and forearm.  She has also been seeing a chiropractor.  Typically sees him once a month, has an appointment at the end of September.    She also has complaints of neck pain and her neck feels tired.  She also notes cracking with rotating her neck.    Notes difficulty sleeping 2* pain.      Patient Goals  Patient goals for therapy: increased motion, decreased pain and increased strength  Patient goal: \"To get rid of the symptoms into my arm, to have less pain.\"  Pain  At best pain ratin  At worst pain ratin  Location: Neck - radicular symptoms into B UE.  Quality: dull ache, discomfort, tight and throbbing  Relieving factors: " medications  Aggravating factors: lifting (Carrying items)    Social Support  Lives with: spouse    Employment status: not working  Hand dominance: right      Diagnostic Tests  Abnormal MRI: See Above.  Treatments  Previous treatment: chiropractic      Objective     Concurrent Complaints  Positive for disturbed sleep (throbbing in wrist will waker her up) and headaches (h/o headaches). Negative for dizziness    Static Posture     Head  Forward.    Shoulders  Rounded.    Postural Observations  Seated posture: fair  Standing posture: fair  Correction of posture: has no consistent effect      Palpation   Left   Hypertonic in the cervical paraspinals, levator scapulae, suboccipitals and upper trapezius.     Right   Hypertonic in the cervical paraspinals, levator scapulae, suboccipitals and upper trapezius.     Neurological Testing     Sensation   Cervical/Thoracic   Left   Intact: light touch    Right   Intact: light touch    Active Range of Motion   Cervical/Thoracic Spine       Cervical    Flexion: 45 degrees   Extension: 40 degrees      Left lateral flexion: 40 degrees      Right lateral flexion: 40 degrees      Left rotation: 60 degrees  Right rotation: 70 degrees     Left Shoulder   Flexion: 180 degrees   Abduction: 180 degrees   External rotation 90°: 90 degrees   Internal rotation 90°: 55 degrees     Right Shoulder   Flexion: 180 degrees   Abduction: 180 degrees   External rotation 90°: 90 degrees  Internal rotation 90°: 55 degrees     Left Elbow   Flexion: WFL  Extension: WFL    Right Elbow   Flexion: WFL  Extension: WFL    Strength/Myotome Testing     Left Shoulder     Planes of Motion   Flexion: 4+   Abduction: 4+   External rotation at 0°: WFL   Internal rotation at 0°: WFL     Right Shoulder     Planes of Motion   Flexion: 4+   Abduction: 4+   External rotation at 0°: WFL   Internal rotation at 0°: WFL     Left Elbow   Flexion: WFL  Extension: WFL    Right Elbow   Flexion: WFL  Extension: WFL    Tests  "  Cervical   Positive neck flexor muscle endurance test.  Negative vertical compression and cervical distraction.     Lumbar   Negative vertical compression.   Neuro Exam:     Headaches   Patient reports headaches: Yes (h/o headaches).                 Precautions: h/o breast cancer, h/o melanoma, HTN       Manuals 9/3       STM vs IASTM to B UT and periscapular muscles                                Neuro Re-Ed         MTP/LTP        Ball Roll on Wall        Yovanny ER w/TBand                                        Ther Ex        UBE Alt for strength        UT Stretch HEP       Levator Stretch HEP       Doorway Stretch        Supine Punch        S/L ER & Abd        Prone Flex/Ext/Rows        Cerv Snag w/towel HEP       C/S Ext with towel        C/S Retraction        C/S Ret, Ext                Ther Activity                        Gait Training                        Modalities        HP/CP prn                          Access Code: [email or web address]  URL: [email or web address]  Date: 09/03/2024  Prepared by: Little    Exercises  - Seated Upper Trapezius Stretch  - 1 x daily - 7 x weekly - 1 sets - 4 reps - 20\" hold  - Seated Levator Scapulae Stretch  - 1 x daily - 7 x weekly - 1 sets - 4 reps - 20\" hold  - Seated Assisted Cervical Rotation with Towel  - 1 x daily - 7 x weekly - 1 sets - 10 reps - 10\" hold                  "

## 2024-09-05 ENCOUNTER — OFFICE VISIT (OUTPATIENT)
Dept: PHYSICAL THERAPY | Facility: CLINIC | Age: 74
End: 2024-09-05
Payer: MEDICARE

## 2024-09-05 DIAGNOSIS — M54.12 RADICULOPATHY, CERVICAL REGION: Primary | ICD-10-CM

## 2024-09-05 PROCEDURE — 97110 THERAPEUTIC EXERCISES: CPT

## 2024-09-05 NOTE — PROGRESS NOTES
"Daily Note     Today's date: 2024  Patient name: Jazmín Mccollum  : 1950  MRN: [id number]  Referring provider: Canelo Rico MD  Dx:   Encounter Diagnosis     ICD-10-CM    1. Radiculopathy, cervical region  M54.12                      Subjective: Pt reports she has been doing her stretches at home without issue.       Objective: See treatment diary below      Assessment: Tolerated treatment well. Initiated exercises as outlined in POC. Patient demonstrated fatigue post treatment, exhibited good technique with therapeutic exercises, and would benefit from continued PT      Plan: Continue per plan of care.         Precautions: h/o breast cancer, h/o melanoma, HTN       Manuals 9/3 9/5      STM vs IASTM to B UT and periscapular muscles  MJC   STM to B UT and periscapular   muscles                              Neuro Re-Ed         MTP/LTP        Ball Roll on Wall        Yovanny ER w/TBand                                        Ther Ex        UBE Alt for strength  120 rpm  3'/3'      UT Stretch HEP 30\" x3 ea      Levator Stretch HEP 30\" x3 ea      Doorway Stretch  30\" x3      Supine Punch        S/L ER & Abd        Prone Flex/Ext/Rows        Cerv Snag w/towel HEP 10\" x10      C/S Ext with towel  5\" x10      C/S Retraction  5\" x10      C/S Ret, Ext                Ther Activity                        Gait Training                        Modalities        HP/CP prn  HP x10 min B/L UT                          "

## 2024-09-10 ENCOUNTER — OFFICE VISIT (OUTPATIENT)
Dept: PHYSICAL THERAPY | Facility: CLINIC | Age: 74
End: 2024-09-10
Payer: MEDICARE

## 2024-09-10 DIAGNOSIS — M54.12 RADICULOPATHY, CERVICAL REGION: Primary | ICD-10-CM

## 2024-09-10 PROCEDURE — 97140 MANUAL THERAPY 1/> REGIONS: CPT | Performed by: PHYSICAL THERAPIST

## 2024-09-10 PROCEDURE — 97110 THERAPEUTIC EXERCISES: CPT | Performed by: PHYSICAL THERAPIST

## 2024-09-10 PROCEDURE — 97010 HOT OR COLD PACKS THERAPY: CPT | Performed by: PHYSICAL THERAPIST

## 2024-09-10 NOTE — PROGRESS NOTES
"Daily Note     Today's date: 9/10/2024  Patient name: Jazmín Mccollum  : 1950  MRN: [id number]  Referring provider: Canelo Rico MD  Dx:   Encounter Diagnosis     ICD-10-CM    1. Radiculopathy, cervical region  M54.12                      Subjective: Pt reports no adverse reactions following previous session. At night, she notices increased stiffness in her neck.      Objective: See treatment diary below      Assessment: Pt presented with significant increase in muscle tension which was reduced following manual therapy techniques, but still present.  Pt required minimal verbalTolerated treatment well. Patient demonstrated fatigue post treatment, exhibited good technique with therapeutic exercises, and would benefit from continued PT      Plan: Progress treatment as tolerated.       Precautions: h/o breast cancer, h/o melanoma, HTN       Manuals 9/3 9/5 9-10     STM vs IASTM to B UT and periscapular muscles  MJC   STM to B UT and periscapular   muscles JG                             Neuro Re-Ed         MTP/LTP   10 x 5\" ea NV     Ball Roll on Wall        Yovanny ER w/TBand                Scapular retraction   15 x 5\" NV                     Ther Ex        UBE Alt for strength  120 rpm  3'/3' 3'/3' 120 rpm     UT Stretch HEP 30\" x3 ea 4 x 30\" ea     Levator Stretch HEP 30\" x3 ea 4 x 30\" ea     Doorway Stretch  30\" x3 4 x 30\"     Supine Punch        S/L ER & Abd        Prone Flex/Ext/Rows        Cerv Snag w/towel HEP 10\" x10 10 x 10\"     C/S Ext with towel  5\" x10 10 x 5\"     C/S Retraction  5\" x10 10 x 5\"     C/S Ret, Ext                Ther Activity                        Gait Training                        Modalities        HP/CP prn  HP x10 min B/L UT HP x10 min B/L UT                  * 38 minutes 1:1 time this date.  "

## 2024-09-11 ENCOUNTER — OFFICE VISIT (OUTPATIENT)
Dept: PHYSICAL THERAPY | Facility: CLINIC | Age: 74
End: 2024-09-11
Payer: MEDICARE

## 2024-09-11 DIAGNOSIS — M54.12 RADICULOPATHY, CERVICAL REGION: Primary | ICD-10-CM

## 2024-09-11 PROCEDURE — 97112 NEUROMUSCULAR REEDUCATION: CPT

## 2024-09-11 PROCEDURE — 97140 MANUAL THERAPY 1/> REGIONS: CPT

## 2024-09-11 PROCEDURE — 97110 THERAPEUTIC EXERCISES: CPT

## 2024-09-11 NOTE — PROGRESS NOTES
"Daily Note     Today's date: 2024  Patient name: Jazmín Mccollum  : 1950  MRN: [id number]  Referring provider: Canelo Rico MD  Dx:   Encounter Diagnosis     ICD-10-CM    1. Radiculopathy, cervical region  M54.12                      Subjective:  Jazmín reports that she is doing well.  She notes feeling like she worked yesterday in PT but it didn't make her too sore.       Objective: See treatment diary below      Assessment: Patient tolerated treatment well. Patient performed ex and received manual therapy/modality as noted.  Progressed ex as indicated with good patient response.  + B UT and R>L upper cervical paraspinal tightness noted which improved with manual therapy performed.  Patient is making good progressed noting decreased radicular symptoms and frequency.  Patient demonstrated a good understanding of her established ex program.  Patient would benefit from continued PT intervention to address deficits and attain set goals.       Plan: Continue per plan of care.      Precautions: h/o breast cancer, h/o melanoma, HTN       Manuals 9/3 9/5 9-10 9-11    STM vs IASTM to B UT and periscapular muscles  MJC   STM to B UT and periscapular   muscles JG CC  STM B UT/periscapular and paraspinals                            Neuro Re-Ed         MTP/LTP   10 x 5\" ea NV RED  10x5\" ea    Ball Roll on Wall        Yovanny ER w/TBand                Scapular retraction   15 x 5\" NV 15x5\"                    Ther Ex        UBE Alt for strength  120 rpm  3'/3' 3'/3' 120 rpm 3'/3' 120 rpm    UT Stretch HEP 30\" x3 ea 4 x 30\" ea 4x30\" ea    Levator Stretch HEP 30\" x3 ea 4 x 30\" ea 4x30\" ea    Doorway Stretch  30\" x3 4 x 30\" 4x30\"    Supine Punch        S/L ER & Abd        Prone Flex/Ext/Rows        Cerv Snag w/towel HEP 10\" x10 10 x 10\" 10x10\"    C/S Ext with towel  5\" x10 10 x 5\" 10x5\"    C/S Retraction  5\" x10 10 x 5\" 10x5\"    C/S Ret, Ext                Ther Activity                        Gait Training              "           Modalities        HP/CP prn  HP x10 min B/L UT HP x10 min B/L UT HP x 10 min B/L UT post

## 2024-09-12 ENCOUNTER — APPOINTMENT (OUTPATIENT)
Dept: PHYSICAL THERAPY | Facility: CLINIC | Age: 74
End: 2024-09-12
Payer: MEDICARE

## 2024-09-16 ENCOUNTER — OFFICE VISIT (OUTPATIENT)
Dept: PHYSICAL THERAPY | Facility: CLINIC | Age: 74
End: 2024-09-16
Payer: MEDICARE

## 2024-09-16 DIAGNOSIS — M54.12 RADICULOPATHY, CERVICAL REGION: Primary | ICD-10-CM

## 2024-09-16 PROCEDURE — 97140 MANUAL THERAPY 1/> REGIONS: CPT | Performed by: PHYSICAL THERAPIST

## 2024-09-16 PROCEDURE — 97112 NEUROMUSCULAR REEDUCATION: CPT | Performed by: PHYSICAL THERAPIST

## 2024-09-16 PROCEDURE — 97110 THERAPEUTIC EXERCISES: CPT | Performed by: PHYSICAL THERAPIST

## 2024-09-16 NOTE — PROGRESS NOTES
"Daily Note     Today's date: 2024  Patient name: Jazmín Mccollum  : 1950  MRN: [id number]  Referring provider: Canelo Rico MD  Dx:   Encounter Diagnosis     ICD-10-CM    1. Radiculopathy, cervical region  M54.12                      Subjective: Pt reports increase in left cervical pain for two days following previous session.       Objective: See treatment diary below      Assessment: Pt required moderate verbal and tactile cues to avoid shrugging during TB posture exercises this date. Tolerated treatment well. Patient demonstrated fatigue post treatment, exhibited good technique with therapeutic exercises, and would benefit from continued PT.      Plan: Progress treatment as tolerated.       Precautions: h/o breast cancer, h/o melanoma, HTN       Manuals 9/3 9/5 9-10 9-11 9-16   STM vs IASTM to B UT and periscapular muscles  MJC   STM to B UT and periscapular   muscles JG CC  STM B UT/periscapular and paraspinals JG                           Neuro Re-Ed         MTP/LTP   10 x 5\" ea NV RED  10x5\" ea RED  10x5\" ea   Ball Roll on Wall        Yovanny ER w/TBand                Scapular retraction   15 x 5\" NV 15x5\" 20 x 5\"                   Ther Ex        UBE Alt for strength  120 rpm  3'/3' 3'/3' 120 rpm 3'/3' 120 rpm 4'/4' 120 rpm   UT Stretch HEP 30\" x3 ea 4 x 30\" ea 4x30\" ea 4 x 30\" ea   Levator Stretch HEP 30\" x3 ea 4 x 30\" ea 4x30\" ea 4 x 30\" ea   Doorway Stretch  30\" x3 4 x 30\" 4x30\" 4 x 30\"   Supine Punch        S/L ER & Abd        Prone Flex/Ext/Rows        Cerv Snag w/towel HEP 10\" x10 10 x 10\" 10x10\" 10 x 10\" ea   C/S Ext with towel  5\" x10 10 x 5\" 10x5\" 10 x 5\"   C/S Retraction  5\" x10 10 x 5\" 10x5\" 10 x 5\"   C/S Ret, Ext                Ther Activity                        Gait Training                        Modalities        HP/CP prn  HP x10 min B/L UT HP x10 min B/L UT HP x 10 min B/L UT post                 * 38 minutes 1:1 time this date.  "

## 2024-09-18 ENCOUNTER — OFFICE VISIT (OUTPATIENT)
Dept: PHYSICAL THERAPY | Facility: CLINIC | Age: 74
End: 2024-09-18
Payer: MEDICARE

## 2024-09-18 DIAGNOSIS — M54.12 RADICULOPATHY, CERVICAL REGION: Primary | ICD-10-CM

## 2024-09-18 PROCEDURE — 97140 MANUAL THERAPY 1/> REGIONS: CPT

## 2024-09-18 PROCEDURE — 97112 NEUROMUSCULAR REEDUCATION: CPT

## 2024-09-18 PROCEDURE — 97110 THERAPEUTIC EXERCISES: CPT

## 2024-09-18 NOTE — PROGRESS NOTES
"Daily Note     Today's date: 2024  Patient name: Jazmín Mccollum  : 1950  MRN: [id number]  Referring provider: Canelo Rico MD  Dx:   Encounter Diagnosis     ICD-10-CM    1. Radiculopathy, cervical region  M54.12                      Subjective: Pt continues to report noticed improvement, denies radicular symptoms into UEs now.       Objective: See treatment diary below      Assessment: Tolerated treatment well. Gradual progression with tband exercises today for scapular strengthening. Patient demonstrated fatigue post treatment, exhibited good technique with therapeutic exercises, and would benefit from continued PT      Plan: Continue per plan of care.      Precautions: h/o breast cancer, h/o melanoma, HTN       Manuals  9-10 9   STM vs IASTM to B UT and periscapular muscles MJC  STM to B UT and periscapular nmuscles MJC   STM to B UT and periscapular   muscles JG CC  STM B UT/periscapular and paraspinals JG                           Neuro Re-Ed         MTP/LTP RTB  5\" 2x10 ea  10 x 5\" ea NV RED  10x5\" ea RED  10x5\" ea   Ball Roll on Wall        Yovanny ER w/TBand                Scapular retraction 20 x5\"  15 x 5\" NV 15x5\" 20 x 5\"                   Ther Ex        UBE Alt for strength 120 rpm  4'/4' 120 rpm  3'/3' 3'/3' 120 rpm 3'/3' 120 rpm 4'/4' 120 rpm   UT Stretch 4 x30\" ea 30\" x3 ea 4 x 30\" ea 4x30\" ea 4 x 30\" ea   Levator Stretch 4 x30\" ea 30\" x3 ea 4 x 30\" ea 4x30\" ea 4 x 30\" ea   Doorway Stretch 4 x30\" 30\" x3 4 x 30\" 4x30\" 4 x 30\"   Supine Punch        S/L ER & Abd        Prone Flex/Ext/Rows        Cerv Snag w/towel 10 x10\" ea 10\" x10 10 x 10\" 10x10\" 10 x 10\" ea   C/S Ext with towel 5\" x10 5\" x10 10 x 5\" 10x5\" 10 x 5\"   C/S Retraction 5\" x10 5\" x10 10 x 5\" 10x5\" 10 x 5\"   C/S Ret, Ext                Ther Activity                        Gait Training                        Modalities        HP/CP prn HP x 10 min B/L UT post HP x10 min B/L UT HP x10 min B/L UT HP x 10 min B/L UT " post

## 2024-09-24 ENCOUNTER — APPOINTMENT (OUTPATIENT)
Dept: PHYSICAL THERAPY | Facility: CLINIC | Age: 74
End: 2024-09-24
Payer: MEDICARE

## 2024-09-26 ENCOUNTER — APPOINTMENT (OUTPATIENT)
Dept: PHYSICAL THERAPY | Facility: CLINIC | Age: 74
End: 2024-09-26
Payer: MEDICARE

## 2024-09-30 ENCOUNTER — EVALUATION (OUTPATIENT)
Dept: PHYSICAL THERAPY | Facility: CLINIC | Age: 74
End: 2024-09-30
Payer: MEDICARE

## 2024-09-30 DIAGNOSIS — M54.12 RADICULOPATHY, CERVICAL REGION: Primary | ICD-10-CM

## 2024-09-30 PROCEDURE — 97112 NEUROMUSCULAR REEDUCATION: CPT | Performed by: PHYSICAL THERAPIST

## 2024-09-30 PROCEDURE — 97110 THERAPEUTIC EXERCISES: CPT | Performed by: PHYSICAL THERAPIST

## 2024-09-30 NOTE — PROGRESS NOTES
PT Re-Evaluation  and PT Discharge    Today's date: 2024  Patient name: Jazmín Mccollum  : 1950  MRN: [id number]  Referring provider: Canelo Rico MD  Dx:   Encounter Diagnosis     ICD-10-CM    1. Radiculopathy, cervical region  M54.12                        Assessment  Impairments: abnormal or restricted ROM, activity intolerance, impaired physical strength, lacks appropriate home exercise program, pain with function, poor posture  and activity limitations  Other impairment: decreased flexibility    Assessment details: The patient is a 75 y/o female who presents to PT with diagnosis of cervical radiculopathy.  She has complaints of neck pain along with N&T into BUE though L > R arm.  Her shoulder ROM is WFL.  She demonstrates deficits with decreased C/S ROM and strength, slight decreased shoulder strength, decreased flexibility, decreased postural awareness and muscle tightness.  These deficits lead to difficulty sleeping, difficulty reaching (OH, out to side, across her body and behind her back), difficulty lifting/carrying items and decreased tolerance to functional activities.  She notes difficulty sleeping and pain can wake her up overnight or she has a hard time finding a comfortable position to sleep.  She is still able to complete all her daily activities though has pain with doing them and at times they will trigger radicular symptoms.  The patient would benefit from continued PT to address deficits and improve function.  Tx to include ROM, stretching, strengthening, modalities, HEP, pt education, postural ed, lifting/body mechanics, neuro re-ed, balance/proprioception Te, MT and equipment.      Understanding of Dx/Px/POC: good     Prognosis: good    Goals  STGs:  1.  Initiate and complete HEP with verbal cues. MET  2.  Improve C/S ROM by 5-10 degrees in 4 weeks. MET  3.  Patient to report decreased radicular symptoms in 4 weeks. MET  4.  Decrease C/S pain by > 25% in 4 weeks.  MET    LTGs:  1.  Patient to be I with HEP in 12 weeks.  2.  Improve C/S ROM to WNL t/o in 12 weeks to improve function. MET  3.  Decrease neck pain to < or = to 2-3/10 with activity in 12 weeks to improve function. PARTIALLY MET  3.  Improve BUE strength to 5/5 t/o in 12 weeks to improve function. NOT MET  4.  Postural control is improved to maximal level of function in 12 weeks. PARTIALLY MET  5.  Recreational performance is improved to PLOF in 12 weeks.MET  6.  ADL performance is improved to PLOF in 12 weeks. MET  7.  Patient to report improved sleep in 12 weeks.  PARTIALLY MET      Plan  Patient would benefit from: skilled physical therapy  Planned modality interventions: cryotherapy and thermotherapy: hydrocollator packs    Planned therapy interventions: IASTM, body mechanics training, manual therapy, nerve gliding, neuromuscular re-education, patient/caregiver education, postural training, self care, strengthening, stretching, therapeutic activities, therapeutic exercise, flexibility, functional ROM exercises and home exercise program    Frequency: 2x week  Duration in weeks: 12  Plan of Care beginning date: 9/3/2024  Plan of Care expiration date: 11/26/2024  Treatment plan discussed with: patient  Plan details: Modalities and therapy interventions prn.      Subjective Evaluation    History of Present Illness  Mechanism of injury: The patient states that she has h/o chronic neck pain.  Then she noticed that she was getting numbness and tingling into B UE though L > R arm.    She had seen her PCP, she had x-rays taken in October 2023.        She had an MRI on 6/25/24.   The patient had seen the doctor on 7/17/24.  She was referred to OPPT and she now presents for her evaluation.  She was not able to attend therapy this summer, has now started.  She will be going back to see the doctor later this year for her follow up appointment (sees him every six months).      From EMR of MRI from 6/25:  IMPRESSION:  "  Multilevel cervical spondylitic degenerative change. Only minor canal stenosis without cord compression. Multilevel foraminal narrowing most pronounced at C5-6 on the right.    The patient states that she continues to get numbness and tingling into B UE (can happen daily).  This can go down to her hand.  Also with throbbing into L wrist and forearm.  She has also been seeing a chiropractor.  Typically sees him once a month, has an appointment at the end of September.    She also has complaints of neck pain and her neck feels tired.  She also notes cracking with rotating her neck.    Notes difficulty sleeping 2* pain.        RE-EVALUATION 1 (24): Since time of initial evaluation, pt reports she is no longer experiencing radicular symptoms into BUEs. Patient now only occasionally experiences cracking sensation in cervical spine with cervical rotation. Pt now experiences less sleep disturbances due to pain. She no longer experiencing difficulty reaching (OH, out to side, across her body and behind her back). Pt is no longer experiencing difficulty lifting/carrying laundry or groceries. Pt feels she is back 95% of her premorbid status. At this point in time, pt no longer requires skilled PT services and will be discharged to an independent HEP.   Patient Goals  Patient goals for therapy: increased motion, decreased pain and increased strength  Patient goal: \"To get rid of the symptoms into my arm, to have less pain.\"  Pain  Current pain ratin  At best pain ratin  At worst pain ratin  Quality: dull ache, discomfort, tight and throbbing  Relieving factors: medications  Aggravating factors: lifting (Carrying items)    Social Support  Lives with: spouse    Employment status: not working  Hand dominance: right      Diagnostic Tests  Abnormal MRI: See Above.  Treatments  Previous treatment: chiropractic    Objective     Concurrent Complaints  Positive for disturbed sleep (throbbing in wrist will waker her " "up) and headaches (h/o headaches). Negative for dizziness    Static Posture     Head  Forward.    Shoulders  Rounded.    Postural Observations  Seated posture: fair  Standing posture: fair  Correction of posture: has no consistent effect      Palpation   Left   Hypertonic in the cervical paraspinals, levator scapulae, suboccipitals and upper trapezius.     Right   Hypertonic in the cervical paraspinals, levator scapulae, suboccipitals and upper trapezius.     Neurological Testing     Sensation   Cervical/Thoracic   Left   Intact: light touch    Right   Intact: light touch    Active Range of Motion   Cervical/Thoracic Spine       Cervical    Flexion: 48 degrees   Extension: 52 degrees      Left lateral flexion: 40 degrees      Right lateral flexion: 40 degrees      Left rotation: 80 degrees  Right rotation: 81 degrees     Left Shoulder   Flexion: 180 degrees   Abduction: 180 degrees   External rotation 90°: 90 degrees   Internal rotation 90°: 55 degrees     Right Shoulder   Flexion: 180 degrees   Abduction: 180 degrees   External rotation 90°: 90 degrees  Internal rotation 90°: 55 degrees     Left Elbow   Flexion: WFL  Extension: WFL    Right Elbow   Flexion: WFL  Extension: WFL    Strength/Myotome Testing     Left Shoulder     Planes of Motion   Flexion: 4+   Abduction: 4+   External rotation at 0°: WFL   Internal rotation at 0°: WFL     Right Shoulder     Planes of Motion   Flexion: 4+   Abduction: 4+   External rotation at 0°: WFL   Internal rotation at 0°: WFL     Left Elbow   Flexion: WFL  Extension: WFL    Right Elbow   Flexion: WFL  Extension: WFL    Tests   Cervical   Positive neck flexor muscle endurance test.  Negative vertical compression and cervical distraction.     Lumbar   Negative vertical compression.     Additional Tests Details  DNF: > 20 \" with muscle substitution  Neuro Exam:     Headaches   Patient reports headaches: Yes (h/o headaches).               Precautions: h/o breast cancer, h/o " "melanoma, HTN   Hep: Access Code: [email or web address]     Manuals 9/18 9-30 (RE) 9-10 9-11 9-16   STM vs IASTM to B UT and periscapular muscles MJC  STM to B UT and periscapular nmuscles  JG CC  STM B UT/periscapular and paraspinals JG                                          Neuro Re-Ed             MTP/LTP RTB  5\" 2x10 ea RTB  5\" 2x10 ea 10 x 5\" ea NV RED  10x5\" ea RED  10x5\" ea   Ball Roll on Wall            Yovanny ER w/TBand                         Scapular retraction 20 x5\" 20 x 5\" 15 x 5\" NV 15x5\" 20 x 5\"                             Ther Ex            UBE Alt for strength 120 rpm  4'/4' 120 rpm  4'/4' 3'/3' 120 rpm 3'/3' 120 rpm 4'/4' 120 rpm   UT Stretch 4 x30\" ea  4 x 30\" ea 4x30\" ea 4 x 30\" ea   Levator Stretch 4 x30\" ea  4 x 30\" ea 4x30\" ea 4 x 30\" ea   Doorway Stretch 4 x30\"  4 x 30\" 4x30\" 4 x 30\"   Supine Punch            S/L ER & Abd            Prone Flex/Ext/Rows            Cerv Snag w/towel 10 x10\" ea  10 x 10\" 10x10\" 10 x 10\" ea   C/S Ext with towel 5\" x10  10 x 5\" 10x5\" 10 x 5\"   C/S Retraction 5\" x10  10 x 5\" 10x5\" 10 x 5\"   C/S Ret, Ext                         Ther Activity                                      Gait Training                                      Modalities            HP/CP prn HP x 10 min B/L UT post  HP x10 min B/L UT HP x 10 min B/L UT post                       * Significant time spent this date educating pt on proper exercises to return to at the gym. Reviewed, PT demonstrated, and pt performed afterwards to ensure proper pain-free form and technique. Educated on common errors during exercise with could cause increase in pain/discomfort.            "

## 2025-02-07 ENCOUNTER — OFFICE VISIT (OUTPATIENT)
Dept: URGENT CARE | Facility: CLINIC | Age: 75
End: 2025-02-07
Payer: MEDICARE

## 2025-02-07 VITALS
HEART RATE: 64 BPM | DIASTOLIC BLOOD PRESSURE: 82 MMHG | SYSTOLIC BLOOD PRESSURE: 148 MMHG | RESPIRATION RATE: 20 BRPM | OXYGEN SATURATION: 98 % | TEMPERATURE: 97.3 F

## 2025-02-07 DIAGNOSIS — J02.9 SORE THROAT: ICD-10-CM

## 2025-02-07 DIAGNOSIS — J01.00 ACUTE NON-RECURRENT MAXILLARY SINUSITIS: Primary | ICD-10-CM

## 2025-02-07 LAB — S PYO AG THROAT QL: NEGATIVE

## 2025-02-07 PROCEDURE — 87880 STREP A ASSAY W/OPTIC: CPT

## 2025-02-07 PROCEDURE — 99213 OFFICE O/P EST LOW 20 MIN: CPT

## 2025-02-07 PROCEDURE — G0463 HOSPITAL OUTPT CLINIC VISIT: HCPCS

## 2025-02-07 RX ORDER — AZITHROMYCIN 250 MG/1
TABLET, FILM COATED ORAL
Qty: 6 TABLET | Refills: 0 | Status: SHIPPED | OUTPATIENT
Start: 2025-02-07 | End: 2025-02-11

## 2025-02-07 NOTE — PROGRESS NOTES
[facility]        NAME: Jazmín Mccollum is a 74 y.o. female  : 1950    MRN: [id number]  DATE: 2025  TIME: 3:09 PM    Assessment and Plan   Acute non-recurrent maxillary sinusitis [J01.00]  1. Acute non-recurrent maxillary sinusitis  azithromycin (ZITHROMAX) 250 mg tablet      2. Sore throat  POCT rapid strepA        Sore throat, congestion, worsening sinus pressure , tenderness and PND. Daughter told her strep was going around so she is also requesting strep. Strep neg- will treat for sinusitis.    Patient Instructions       Follow up with PCP in 3-5 days.  Proceed to  ER if symptoms worsen.    If tests have been performed at [facility], our office will contact you with results if changes need to be made to the care plan discussed with you at the visit.  [facility]    Chief Complaint     Chief Complaint   Patient presents with    Sore Throat     One week         History of Present Illness       Sore throat, congestion, worsening sinus pressure , tenderness and PND. Daughter told her strep was going around so she is also requesting strep. Strep neg- will treat for sinusitis.     Sore Throat   Associated symptoms include congestion and coughing.       Review of Systems   Review of Systems   Constitutional:  Negative for activity change and fever.   HENT:  Positive for congestion, postnasal drip, sinus pressure, sinus pain and sore throat.    Respiratory:  Positive for cough.    All other systems reviewed and are negative.        Current Medications       Current Outpatient Medications:     amLODIPine (NORVASC) 5 mg tablet, Take 2.5 mg by mouth daily, Disp: , Rfl:     atorvastatin (LIPITOR) 10 mg tablet, Take 10 mg by mouth one tablet three times a week., Disp: , Rfl:     azithromycin (ZITHROMAX) 250 mg tablet, Take 2 tablets today then 1 tablet daily x 4 days, Disp: 6 tablet, Rfl: 0    calcium citrate-vitamin D (CITRACAL+D) 315-200 MG-UNIT per  tablet, Take by mouth, Disp: , Rfl:     famotidine (PEPCID) 20 mg tablet, Take 1 tablet (20 mg total) by mouth 2 (two) times a day as needed for heartburn, Disp: 180 tablet, Rfl: 3    metoprolol tartrate (LOPRESSOR) 25 mg tablet, Take 25 mg by mouth every 12 (twelve) hours , Disp: , Rfl:     minocycline (MINOCIN) 50 mg capsule, Take 50 mg by mouth daily, Disp: , Rfl:     Multiple Vitamin (MULTIVITAMINS PO), Take by mouth, Disp: , Rfl:     omeprazole (PriLOSEC) 40 MG capsule, Take 1 capsule (40 mg total) by mouth daily, Disp: 90 capsule, Rfl: 3    benzonatate (TESSALON) 200 MG capsule, Take 1 capsule (200 mg total) by mouth 3 (three) times a day as needed for cough, Disp: 20 capsule, Rfl: 0    benzonatate (TESSALON) 200 MG capsule, Take 1 capsule (200 mg total) by mouth 3 (three) times a day as needed for cough, Disp: 40 capsule, Rfl: 1    Current Allergies     Allergies as of 2025 - Reviewed 2025   Allergen Reaction Noted    Adhesive [medical tape]  2020            The following portions of the patient's history were reviewed and updated as appropriate: allergies, current medications, past family history, past medical history, past social history, past surgical history and problem list.     Past Medical History:   Diagnosis Date    BRCA1 negative     BRCA2 negative     Breast cancer (HCC) 10/2004    lt breast lumpectomy    Cancer (HCC)     melanoma on back    History of radiation therapy 10/2004    lt breast cancer lumpectomy    Hyperlipidemia     Hypertension     Ischemic colitis (HCC)     secondary to Covid       Past Surgical History:   Procedure Laterality Date    BREAST BIOPSY Left     benign    BREAST LUMPECTOMY Left 10/2004    lt breast cancer     SECTION      COLONOSCOPY      FOOT ARTHRODESIS, MODIFIED DU      HYSTERECTOMY Bilateral         MOHS SURGERY      nasal area    OOPHORECTOMY Bilateral     SKIN CANCER EXCISION      BACK       Family History   Problem  Relation Age of Onset    Congenital heart disease Mother     Breast cancer Mother 80    Anuerysm Father     Heart attack Brother     Colon cancer Paternal Grandmother     Colon cancer Paternal Grandfather     Heart attack Paternal Aunt     Heart attack Paternal Uncle     No Known Problems Sister     No Known Problems Daughter     No Known Problems Maternal Grandmother     No Known Problems Sister     No Known Problems Daughter     No Known Problems Daughter     No Known Problems Maternal Aunt     No Known Problems Maternal Aunt          Medications have been verified.        Objective   /82   Pulse 64   Temp (!) 97.3 °F (36.3 °C)   Resp 20   SpO2 98%   No LMP recorded. Patient has had a hysterectomy.       Physical Exam     Physical Exam  Vitals reviewed.   Constitutional:       Appearance: She is well-developed.   HENT:      Right Ear: Tympanic membrane normal.      Left Ear: Tympanic membrane normal.      Nose: Congestion present.      Right Sinus: Maxillary sinus tenderness present.      Left Sinus: Maxillary sinus tenderness present.   Cardiovascular:      Rate and Rhythm: Normal rate and regular rhythm.   Lymphadenopathy:      Cervical: No cervical adenopathy.   Neurological:      Mental Status: She is alert.

## 2025-02-17 DIAGNOSIS — K21.9 GASTROESOPHAGEAL REFLUX DISEASE, UNSPECIFIED WHETHER ESOPHAGITIS PRESENT: ICD-10-CM

## 2025-02-18 RX ORDER — FAMOTIDINE 20 MG/1
20 TABLET, FILM COATED ORAL 2 TIMES DAILY PRN
Qty: 180 TABLET | Refills: 1 | Status: SHIPPED | OUTPATIENT
Start: 2025-02-18

## 2025-02-18 RX ORDER — OMEPRAZOLE 40 MG/1
40 CAPSULE, DELAYED RELEASE ORAL DAILY
Qty: 90 CAPSULE | Refills: 1 | Status: SHIPPED | OUTPATIENT
Start: 2025-02-18

## 2025-02-25 ENCOUNTER — OFFICE VISIT (OUTPATIENT)
Dept: GASTROENTEROLOGY | Facility: CLINIC | Age: 75
End: 2025-02-25
Payer: MEDICARE

## 2025-02-25 VITALS
SYSTOLIC BLOOD PRESSURE: 136 MMHG | HEART RATE: 78 BPM | TEMPERATURE: 97.8 F | DIASTOLIC BLOOD PRESSURE: 72 MMHG | RESPIRATION RATE: 18 BRPM | WEIGHT: 131.4 LBS | BODY MASS INDEX: 24.83 KG/M2 | OXYGEN SATURATION: 98 %

## 2025-02-25 DIAGNOSIS — K59.04 CHRONIC IDIOPATHIC CONSTIPATION: ICD-10-CM

## 2025-02-25 DIAGNOSIS — Z12.11 SCREENING FOR COLON CANCER: ICD-10-CM

## 2025-02-25 DIAGNOSIS — K21.9 GASTROESOPHAGEAL REFLUX DISEASE WITHOUT ESOPHAGITIS: Primary | ICD-10-CM

## 2025-02-25 PROCEDURE — 99214 OFFICE O/P EST MOD 30 MIN: CPT | Performed by: STUDENT IN AN ORGANIZED HEALTH CARE EDUCATION/TRAINING PROGRAM

## 2025-02-25 RX ORDER — OMEPRAZOLE 40 MG/1
40 CAPSULE, DELAYED RELEASE ORAL DAILY
Qty: 90 CAPSULE | Refills: 3 | Status: SHIPPED | OUTPATIENT
Start: 2025-02-25

## 2025-02-25 NOTE — PROGRESS NOTES
Name: Jazmín Mccollum      : 1950      MRN: [id number]  Encounter Provider: Concha Cramer MD  Encounter Date: 2025   Encounter department: [facility] [place]  :  Assessment & Plan  Gastroesophageal reflux disease without esophagitis  Symptoms uncontrolled since stopping PPI.  Previously had inadequate control on omeprazole 20 mg daily so will resume omeprazole 40 mg daily.  Discussed that her episodic diarrhea every couple of weeks is not suggestive of side effect from PPI as I would expect PPI to cause diarrhea daily  Orders:    omeprazole (PriLOSEC) 40 MG capsule; Take 1 capsule (40 mg total) by mouth daily    Chronic idiopathic constipation  Symptoms suggestive of CIC with overflow diarrhea.  Still having incomplete bowel movements on miralax once daily. Increase to twice daily and titrate as needed. If no improvement with twice daily, may require prescription laxative.       Screening for colon cancer  Colonoscopy  completed with gastroscope and 5 year recall recommended.  Repeat  though high risk for incomplete colonoscopy           History of Present Illness   HPI  Jazmín Mccollum is a 74 y.o. female who presents for follow up of GERD and constipation/diarrhea    Stopped omeprazole around November by PCP recs due to having episodes of diarrhea every 2 weeks.  Still having episodes of diarrhea since stopping omeprazole but feels like they're less frequent and less severe.  Feels like she doesn't empty completely.  Takes miralax every morning in coffee, only has small bowel movements throughout the day, typically grape size.    Reflux has not been well controlled of omeprazole.  Taking tums, rolaids,famotidine.    CTA 11/15/23  Widely patent mesenteric vasculature.  Fibromuscular dysplasia involving the distal right renal artery.  Diverticulosis.     US 10/30/23  The portal vein is patent. If there is concern for arterial insufficiency of the colon, CTA may  be helpful if clinically indicated.  Mild increased echogenicity liver could be related to mild fatty infiltration.  Echogenic lesion in the liver is somewhat ill-defined and of indeterminate etiology. An atypical hemangioma is possible. MRI is suggested for further evaluation.     Colonoscopy 9/8/2021 sigmoid diverticula, internal hemorrhoids incomplete colonoscopy due to fixed tortuous sigmoid        Review of Systems   Constitutional:  Negative for chills and fever.   HENT:  Negative for ear pain and sore throat.    Eyes:  Negative for pain and visual disturbance.   Respiratory:  Negative for cough and shortness of breath.    Cardiovascular:  Negative for chest pain and palpitations.   Gastrointestinal:  Negative for abdominal pain and vomiting.   Genitourinary:  Negative for dysuria and hematuria.   Musculoskeletal:  Negative for arthralgias and back pain.   Skin:  Negative for color change and rash.   Neurological:  Negative for seizures and syncope.   All other systems reviewed and are negative.         Objective   /72 (BP Location: Right arm, Patient Position: Sitting, Cuff Size: Adult)   Pulse 78   Temp 97.8 °F (36.6 °C) (Temporal)   Resp 18   Wt 59.6 kg (131 lb 6.4 oz)   SpO2 98%   BMI 24.83 kg/m²      Physical Exam  Vitals and nursing note reviewed.   Constitutional:       General: She is not in acute distress.     Appearance: She is well-developed.   HENT:      Head: Normocephalic and atraumatic.   Eyes:      Conjunctiva/sclera: Conjunctivae normal.   Cardiovascular:      Rate and Rhythm: Normal rate and regular rhythm.      Heart sounds: No murmur heard.  Pulmonary:      Effort: Pulmonary effort is normal. No respiratory distress.      Breath sounds: Normal breath sounds.   Abdominal:      Palpations: Abdomen is soft.      Tenderness: There is no abdominal tenderness.   Musculoskeletal:         General: No swelling.      Cervical back: Neck supple.   Skin:     General: Skin is warm and dry.       Capillary Refill: Capillary refill takes less than 2 seconds.   Neurological:      Mental Status: She is alert.   Psychiatric:         Mood and Affect: Mood normal.

## 2025-06-11 ENCOUNTER — APPOINTMENT (OUTPATIENT)
Dept: LAB | Facility: CLINIC | Age: 75
End: 2025-06-11
Payer: MEDICARE

## 2025-06-11 DIAGNOSIS — R73.9 BLOOD GLUCOSE ELEVATED: ICD-10-CM

## 2025-06-11 DIAGNOSIS — Z79.899 ENCOUNTER FOR LONG-TERM (CURRENT) USE OF MEDICATIONS: ICD-10-CM

## 2025-06-11 DIAGNOSIS — Z79.4 ENCOUNTER FOR LONG-TERM (CURRENT) USE OF INSULIN (HCC): ICD-10-CM

## 2025-06-11 DIAGNOSIS — I10 ESSENTIAL HYPERTENSION, MALIGNANT: ICD-10-CM

## 2025-06-11 DIAGNOSIS — E78.5 HYPERLIPIDEMIA, UNSPECIFIED HYPERLIPIDEMIA TYPE: ICD-10-CM

## 2025-06-11 LAB
25(OH)D3 SERPL-MCNC: 55.3 NG/ML (ref 30–100)
ALBUMIN SERPL BCG-MCNC: 4.6 G/DL (ref 3.5–5)
ALP SERPL-CCNC: 97 U/L (ref 34–104)
ALT SERPL W P-5'-P-CCNC: 37 U/L (ref 7–52)
ANION GAP SERPL CALCULATED.3IONS-SCNC: 8 MMOL/L (ref 4–13)
AST SERPL W P-5'-P-CCNC: 35 U/L (ref 13–39)
BILIRUB SERPL-MCNC: 0.85 MG/DL (ref 0.2–1)
BUN SERPL-MCNC: 16 MG/DL (ref 5–25)
CALCIUM SERPL-MCNC: 9.8 MG/DL (ref 8.4–10.2)
CHLORIDE SERPL-SCNC: 103 MMOL/L (ref 96–108)
CHOLEST SERPL-MCNC: 223 MG/DL (ref ?–200)
CO2 SERPL-SCNC: 31 MMOL/L (ref 21–32)
CREAT SERPL-MCNC: 0.74 MG/DL (ref 0.6–1.3)
ERYTHROCYTE [DISTWIDTH] IN BLOOD BY AUTOMATED COUNT: 14 % (ref 11.6–15.1)
GFR SERPL CREATININE-BSD FRML MDRD: 81 ML/MIN/1.73SQ M
GLUCOSE P FAST SERPL-MCNC: 88 MG/DL (ref 65–99)
HCT VFR BLD AUTO: 46.1 % (ref 34.8–46.1)
HDLC SERPL-MCNC: 91 MG/DL
HGB BLD-MCNC: 15.1 G/DL (ref 11.5–15.4)
LDLC SERPL CALC-MCNC: 114 MG/DL (ref 0–100)
MCH RBC QN AUTO: 31.6 PG (ref 26.8–34.3)
MCHC RBC AUTO-ENTMCNC: 32.8 G/DL (ref 31.4–37.4)
MCV RBC AUTO: 96 FL (ref 82–98)
NONHDLC SERPL-MCNC: 132 MG/DL
PLATELET # BLD AUTO: 261 THOUSANDS/UL (ref 149–390)
PMV BLD AUTO: 10.3 FL (ref 8.9–12.7)
POTASSIUM SERPL-SCNC: 4.2 MMOL/L (ref 3.5–5.3)
PROT SERPL-MCNC: 7 G/DL (ref 6.4–8.4)
RBC # BLD AUTO: 4.78 MILLION/UL (ref 3.81–5.12)
SODIUM SERPL-SCNC: 142 MMOL/L (ref 135–147)
TRIGL SERPL-MCNC: 90 MG/DL (ref ?–150)
WBC # BLD AUTO: 3.77 THOUSAND/UL (ref 4.31–10.16)

## 2025-06-11 PROCEDURE — 85027 COMPLETE CBC AUTOMATED: CPT

## 2025-06-11 PROCEDURE — 36415 COLL VENOUS BLD VENIPUNCTURE: CPT

## 2025-06-11 PROCEDURE — 83036 HEMOGLOBIN GLYCOSYLATED A1C: CPT

## 2025-06-11 PROCEDURE — 80061 LIPID PANEL: CPT

## 2025-06-11 PROCEDURE — 82306 VITAMIN D 25 HYDROXY: CPT

## 2025-06-11 PROCEDURE — 80053 COMPREHEN METABOLIC PANEL: CPT

## 2025-06-12 LAB
EST. AVERAGE GLUCOSE BLD GHB EST-MCNC: 111 MG/DL
HBA1C MFR BLD: 5.5 %

## 2025-07-26 ENCOUNTER — HOSPITAL ENCOUNTER (OUTPATIENT)
Dept: MAMMOGRAPHY | Facility: HOSPITAL | Age: 75
Discharge: HOME | End: 2025-07-26
Attending: INTERNAL MEDICINE
Payer: MEDICARE

## 2025-07-26 DIAGNOSIS — Z12.31 ENCOUNTER FOR SCREENING MAMMOGRAM FOR MALIGNANT NEOPLASM OF BREAST: ICD-10-CM

## 2025-07-26 PROCEDURE — 77063 BREAST TOMOSYNTHESIS BI: CPT

## 2025-07-26 PROCEDURE — 77067 SCR MAMMO BI INCL CAD: CPT
